# Patient Record
Sex: FEMALE | Race: BLACK OR AFRICAN AMERICAN | NOT HISPANIC OR LATINO | Employment: FULL TIME | URBAN - METROPOLITAN AREA
[De-identification: names, ages, dates, MRNs, and addresses within clinical notes are randomized per-mention and may not be internally consistent; named-entity substitution may affect disease eponyms.]

---

## 2017-11-27 ENCOUNTER — TELEPHONE (OUTPATIENT)
Dept: OBSTETRICS AND GYNECOLOGY | Facility: CLINIC | Age: 32
End: 2017-11-27

## 2017-11-27 DIAGNOSIS — D25.1 INTRAMURAL AND SUBSEROUS LEIOMYOMA OF UTERUS: Primary | ICD-10-CM

## 2017-11-27 DIAGNOSIS — D25.2 INTRAMURAL AND SUBSEROUS LEIOMYOMA OF UTERUS: Primary | ICD-10-CM

## 2017-11-27 NOTE — TELEPHONE ENCOUNTER
Patient is in the system and needs MRI orders. Also when do you want her scheduled for appt for consult.

## 2017-11-27 NOTE — TELEPHONE ENCOUNTER
----- Message from Jeffry Rivera MA sent at 11/27/2017 10:14 AM CST -----  Contact: Self  Pt is calling to schedule a Fibroid appt with Dr Ruiz.  The pt can be reached at [phone].  Thanks FL

## 2017-11-27 NOTE — TELEPHONE ENCOUNTER
Left message to patient to call the office at [phone] to confirm allergies so that MRI orders can be placed and consult can be scheduled.

## 2017-11-27 NOTE — TELEPHONE ENCOUNTER
Called patient and confirmed allergies. Patient needs MRI orders and states she would prefer to do the MRI the same day as her appt with [facility] since she is coming from [facility].

## 2017-11-27 NOTE — TELEPHONE ENCOUNTER
----- Message from Matthew Peacock sent at 11/27/2017  3:28 PM CST -----  Pt returning your call [phone]

## 2017-11-27 NOTE — TELEPHONE ENCOUNTER
Yes, she needs to be scheduled for a consult.  I can't place the orders until her allergies are obtained.  Please call her to document her allergies.  Thanks

## 2017-11-28 ENCOUNTER — TELEPHONE (OUTPATIENT)
Dept: OBSTETRICS AND GYNECOLOGY | Facility: CLINIC | Age: 32
End: 2017-11-28

## 2017-11-28 NOTE — TELEPHONE ENCOUNTER
Contacted the pt to inform her that her MRI order has been placed and that she can get it scheduled. No answer, left VM message for the pt to call the clinic back if she has any questions or concerns.

## 2017-11-28 NOTE — TELEPHONE ENCOUNTER
Contacted the pt to schedule her fibroids consult with Dr. Ruiz and her MRI. Pt given appointment locations, dates, and times. Pt informed that letter reminders will be sent out. Pt inquired if the addresses will be on the letter reminders. Informed the pt that they will. Pt verbalized understanding. Letters sent out.

## 2017-11-28 NOTE — TELEPHONE ENCOUNTER
----- Message from Ra Alvarenga sent at 11/28/2017 10:27 AM CST -----  Please call pt to explain her procedure for mri pelvis. Pt can be reached at [phone].

## 2017-12-12 ENCOUNTER — HOSPITAL ENCOUNTER (OUTPATIENT)
Dept: RADIOLOGY | Facility: OTHER | Age: 32
Discharge: HOME | End: 2017-12-12
Attending: OBSTETRICS & GYNECOLOGY
Payer: COMMERCIAL

## 2017-12-12 DIAGNOSIS — D25.2 INTRAMURAL AND SUBSEROUS LEIOMYOMA OF UTERUS: ICD-10-CM

## 2017-12-12 DIAGNOSIS — D25.1 INTRAMURAL AND SUBSEROUS LEIOMYOMA OF UTERUS: ICD-10-CM

## 2017-12-12 PROCEDURE — 25500020 PHARM REV CODE 255: Performed by: OBSTETRICS & GYNECOLOGY

## 2017-12-12 PROCEDURE — 72197 MRI PELVIS W/O & W/DYE: CPT | Mod: 26,,, | Performed by: RADIOLOGY

## 2017-12-12 PROCEDURE — A9585 GADOBUTROL INJECTION: HCPCS | Performed by: OBSTETRICS & GYNECOLOGY

## 2017-12-12 PROCEDURE — 72197 MRI PELVIS W/O & W/DYE: CPT | Mod: TC

## 2017-12-12 RX ORDER — GADOBUTROL 604.72 MG/ML
10 INJECTION INTRAVENOUS
Status: COMPLETED | OUTPATIENT
Start: 2017-12-12 | End: 2017-12-12

## 2017-12-12 RX ADMIN — GADOBUTROL 10 ML: 604.72 INJECTION INTRAVENOUS at 11:12

## 2017-12-14 ENCOUNTER — INITIAL CONSULT (OUTPATIENT)
Dept: MATERNAL FETAL MEDICINE | Facility: CLINIC | Age: 32
End: 2017-12-14
Payer: COMMERCIAL

## 2017-12-14 ENCOUNTER — OFFICE VISIT (OUTPATIENT)
Dept: OBSTETRICS AND GYNECOLOGY | Facility: CLINIC | Age: 32
End: 2017-12-14
Attending: OBSTETRICS & GYNECOLOGY
Payer: COMMERCIAL

## 2017-12-14 VITALS
DIASTOLIC BLOOD PRESSURE: 94 MMHG | WEIGHT: 270.06 LBS | SYSTOLIC BLOOD PRESSURE: 158 MMHG | BODY MASS INDEX: 44.94 KG/M2

## 2017-12-14 VITALS
BODY MASS INDEX: 45.04 KG/M2 | SYSTOLIC BLOOD PRESSURE: 144 MMHG | DIASTOLIC BLOOD PRESSURE: 88 MMHG | HEIGHT: 65 IN | WEIGHT: 270.31 LBS

## 2017-12-14 DIAGNOSIS — D25.1 FIBROIDS, INTRAMURAL: Primary | ICD-10-CM

## 2017-12-14 DIAGNOSIS — E66.01 MORBID OBESITY WITH BMI OF 40.0-44.9, ADULT: ICD-10-CM

## 2017-12-14 PROCEDURE — 99244 OFF/OP CNSLTJ NEW/EST MOD 40: CPT | Mod: S$GLB,,, | Performed by: OBSTETRICS & GYNECOLOGY

## 2017-12-14 PROCEDURE — 99999 PR PBB SHADOW E&M-EST. PATIENT-LVL II: CPT | Mod: PBBFAC,,, | Performed by: OBSTETRICS & GYNECOLOGY

## 2017-12-14 PROCEDURE — 99999 PR PBB SHADOW E&M-EST. PATIENT-LVL III: CPT | Mod: PBBFAC,,, | Performed by: OBSTETRICS & GYNECOLOGY

## 2017-12-14 PROCEDURE — 99243 OFF/OP CNSLTJ NEW/EST LOW 30: CPT | Mod: S$GLB,,, | Performed by: OBSTETRICS & GYNECOLOGY

## 2017-12-14 RX ORDER — SODIUM CHLORIDE 9 MG/ML
INJECTION, SOLUTION INTRAVENOUS CONTINUOUS
Status: CANCELLED | OUTPATIENT
Start: 2017-12-14

## 2017-12-14 NOTE — LETTER
December 19, 2017      Rosette Dawn MD  [address]   [facility]  [facility] [address]           [facility]  [address]  [facility] [address]  Phone: [phone]          Patient: Lore Garcia   MR Number: [id number]   YOB: 1985   Date of Visit: 12/14/2017       Dear Dr. Rosette Dawn:    Thank you for referring Lore Garcia to me for evaluation. Attached you will find relevant portions of my assessment and plan of care.    If you have questions, please do not hesitate to call me. I look forward to following Lore Garcia along with you.    Sincerely,    Effie Alegria  CC:  No Recipients    If you would like to receive this communication electronically, please contact [email or web address] or [phone] to request more information on [employer] access.    For providers and/or their staff who would like to refer a patient to [facility], please contact us through our one-stop-shop provider referral line, Con Pierce, at [phone].    If you feel you have received this communication in error or would no longer like to receive these types of communications, please e-mail [email or web address]

## 2017-12-14 NOTE — PROGRESS NOTES
CC: Symptoms related to fibroids    Lore Garcia is a 32 y.o. female  presents for a consultation from Dr. Rosette Dawn for management of fibroids.      Patient had a previous pregnancy.  Around 19 weeks, she began having severe pelvic pain on a Tuesday.  She was examined in clinic and in ultrasound and cervix was found to be closed per patient.  By , pain became more severe.  She was seen in the ED and found to have a bulging bag of water.  She delivered soon after.  Prior to pregnancy, she did not know that she had fibroids.      Patient reports cycles are regular but heavy.  She has to use pads and tampons. She reports clots.  She also reports accidents.  She reports minimal dysmenorrhea.      MRI shows:    Uterus: Enlarged, measuring 15.5 x 8.8 x 8.0 cm with lobulated contour containing multiple fibroids.  Fibroids are primarily intramural and subserosal in location, with 3 of the smaller fibroids directly abutting the endometrium and a questionably submucosal location; the largest of these 3 measures 2.3 x 2.8 x 2.1 cm anteriorly along the uterine body.  The largest fibroid overall is subserosal in location and measures 8.6 x 7.8 x 7.3 cm at the uterine fundus.  Mild heterogeneous enhancement is present within a few of the smaller fibroids, though overall there is no enhancement.  Fibroids are primarily heterogeneously T2 hypointense without substantial enhancement.  A pedunculated subserosal fibroid along the posterior uterine body/fundus measuring 2.2 x 2.0 x 2.9 cm has small amount of intrinsic T2 hyperintensity corresponding to an area of non-enhancement suggesting component of myxoid degeneration.  The endometrial stripe is distorted and displaced by the fibroids and measures up to 10 mm in thickness.    Right ovary: 2.3 x 1.4 x 2.6 cm with normal appearance.    Left ovary: 2.5 x 2.2 x 1.6 cm with normal appearance.    Other intra-abdominal structures: No obvious abnormality.  Trace free  "fluid.    Bones: Normal marrow signal.  Mild disc bulge L5-S1 without spinal canal stenosis but with suggestion of left-sided foraminal stenosis.    She desires to conceive again.      Past Medical History:   Diagnosis Date    Hypertension        Past Surgical History:   Procedure Laterality Date    THYROIDECTOMY, PARTIAL Left        Family History   Problem Relation Age of Onset    Breast cancer Neg Hx     Colon cancer Neg Hx     Ovarian cancer Neg Hx        Social History   Substance Use Topics    Smoking status: Never Smoker    Smokeless tobacco: Never Used    Alcohol use 1.2 oz/week     2 Glasses of wine per week       OB History    Para Term  AB Living   1 0   0 1 0   SAB TAB Ectopic Multiple Live Births   1       0      # Outcome Date GA Lbr Fran/2nd Weight Sex Delivery Anes PTL Lv   1 SAB 17 20w0d  0.283 kg (10 oz) F Vag-Spont  N FD      Complications: Abruptio Placenta          BP (!) 144/88 (BP Location: Left arm, Patient Position: Sitting, BP Method: Large (Manual))   Ht 5' 5" (1.651 m)   Wt 122.6 kg (270 lb 4.5 oz)   LMP 2017   BMI 44.98 kg/m²     ROS:  GENERAL: Denies weight gain or weight loss. Feeling well overall.   SKIN: Denies rash or lesions.   HEAD: Denies head injury or headache.   NODES: Denies enlarged lymph nodes.   CHEST: Denies chest pain or shortness of breath.   CARDIOVASCULAR: Denies palpitations or left sided chest pain.   ABDOMEN: No abdominal pain, constipation, diarrhea, nausea, vomiting or rectal bleeding.   URINARY: No frequency, dysuria, hematuria, or burning on urination.  REPRODUCTIVE: See HPI.   HEMATOLOGIC: No easy bruisability or excessive bleeding with the exception of menstrual cycles.  MUSCULOSKELETAL: Denies joint pain or swelling.   NEUROLOGIC: Denies syncope or weakness.   PSYCHIATRIC: Denies depression, anxiety or mood swings.    PHYSICAL EXAM:  APPEARANCE: Well nourished, well developed, in no acute distress.  AFFECT: WNL, alert " and oriented x 3  SKIN: No acne or hirsutism  NECK: Neck symmetric without masses or thyromegaly  NODES: No inguinal, cervical, axillary, or femoral lymph node enlargement  CHEST: Good respiratory effect  ABDOMEN: Soft.  No tenderness or masses.  No hepatosplenomegaly.  No hernias.  PELVIC: Normal external genitalia without lesions.  Normal hair distribution.  Adequate perineal body, normal urethral meatus.  Vagina moist and well rugated without lesions or discharge.  Cervix pink, without lesions, discharge or tenderness.  No significant cystocele or rectocele.  Bimanual exam shows uterus to be approximately 16 week size, irregular, mobile and nontender.  Adnexa without masses or tenderness but difficult to assess due to body habitus.    EXTREMITIES: No edema.      ICD-10-CM ICD-9-CM    1. Fibroids, intramural D25.1 218.1 Up ad jean carlos      [place] to [place]      POCT glucose      Notify physician if BS > 180 for hysterectomy patients      Chlorhexidine (CHG) 2% Wipes      Notify Physician/Vital Signs Parameters Urine output less than 0.5mL/kg/hr (with indwelling catheter) or 30 mL/hr (without indwelling catheter) or blood glucose greater than 200 mg/dL      Notify physician      Case Request Operating Room: ROBOTIC ASSISTED LAPAROSCOPIC MYOMECTOMY      Place in Outpatient      Vital signs      Diet NPO      Place sequential compression device      Place HARINI hose   2. Morbid obesity with BMI of 40.0-44.9, adult E66.01 278.01     Z68.41 V85.41          Patient was counseled today on fibroids and affect on fertility and pregnancy.  Discussed that previous second trimester loss may or may not have been due to fibroids, though it does sound like she may have had degenerative changes that caused labor.  Discussed management of fibroids and the risks/benefits of removing fibroids.  Patient desires removal.  Discussed routes of myomectomy - robotically assisted vs. Open myomectomy.  Discussed the benefits of a minimally  invasive option as well as the limitations (i.e. Not being able to remove all fibroids).  Discussed obesity and risk of complications both laparoscopically and open specific to obesity.  Discussed the risk of recurrence of fibroids, the need to delay conception for 4-6 months and the need for  for delivery.      Patient desires to proceed with a robotically assisted laparoscopic approach.  Surgery scheduled .

## 2017-12-14 NOTE — LETTER
2017        Camron Bean MD  [address] [address]             [facility] [address]  [address] [place] [address] [address]  [facility] [address]  Phone: [phone]  Fax: [phone]   Patient: Lore Garcia   MR Number: [id number]   YOB: 1985   Date of Visit: 2017     Dear Dr. Bean,     Lore Garcia is a 32 y.o. female  has been referred to me for management of her fibroids.  As you know, Lore has multiple fibroids.  After discussion of treatment options, she has decided to proceed with a robotic assisted laparoscopic myomectomy.      Thank you for allowing me to assist in the care of your patient.    Sincerely,      Elicia Ruiz MD            CC  No Recipients    Enclosure

## 2017-12-14 NOTE — PATIENT INSTRUCTIONS
Having Robotic-Assisted Myomectomy  Robotic-assisted myomectomy is a type of surgery. Its done to remove growths in a womens womb (uterus) called fibroid tumors. The tumors are not cancer. The surgery is done with special tools and a robotic controller.  What to tell your healthcare provider  Tell your healthcare provider about all the medicines you take. This includes over-the-counter medicines such as ibuprofen. It also includes vitamins, herbs, and other supplements. And tell your healthcare provider if you:  · Have had any recent changes in your health, such as an infection or fever  · Are sensitive or allergic to any medicines, latex, tape, or anesthesia (local and general)  · Are pregnant or think you may be pregnant  · Plan to get pregnant after having this surgery  Tests before your surgery  Before your surgery, a healthcare provider will ask you questions about your health. He or she will also examine you. This includes checking your heart and lungs. You may need tests such as:  · Electrocardiogram to check your heart rhythm  · Ultrasound exam of your pelvis to look at your fibroids  · MRI to get more information about your fibroids  · Blood tests to check your overall health  Getting ready for your surgery  Talk with your healthcare provider how to get ready for your surgery. You may need to stop taking some medicines before the procedure, such as blood thinners and aspirin. If you smoke, you may need to stop before your surgery. Smoking can delay healing. Talk with your healthcare provider if you need help to stop smoking.  Also, make sure to:  · Ask a family member or friend to take you home from the hospital  · Not eat or drink after midnight the night before your surgery  · Follow all other instructions from your healthcare provider  You will be asked to sign a consent form that gives your permission to do the procedure. Read the form carefully. Ask questions if something is not clear.  On the day  of your surgery  Your doctor will explain the details of your surgery. Your surgery will be done by an obstetrician/gynecologist (OB/GYN) surgeon. He or she will work with a team of specialized nurses. The surgery can be done in several ways. Ask your doctor about the details of your surgery. The whole procedure may take a couple of hours. In general, you can expect the following:  · You will have general anesthesia, a medicine that allows you to sleep through the surgery. You wont feel any pain during the surgery.  · A healthcare provider will watch your vital signs, like your heart rate and blood pressure, during the surgery.  · You may be given antibiotics during and after the surgery. This is to help prevent infection.  · After cleaning your skin, the surgeon will make a few small cuts (incisions) in your abdomen.  · A small tube may be used to send some gas into your abdomen. This helps the surgeon see the area better during surgery.  · the surgeon will pass tools through the small incisions. These include a tiny camera with a light, and several robotic tools. The robotic tools allow the surgeon to make very small movements.  · The surgeon will use the robotic controller to move the tools and remove the fibroids.  · When the surgery is done, the tools will be removed. The incisions will be closed and bandaged.  After your surgery  After the surgery, a healthcare provider will watch your vital signs. He or she will also check your incisions. You may be able to go home the same day. Or you may need to stay overnight.  Recovering at home  Make sure you move around as much as possible after your surgery. This helps to prevent problems like blood clots. You may have some pain after the surgery. This includes pain in your shoulder from the gas used during surgery. Your healthcare provider will tell you what to take for pain.  Follow-up care  Make sure you follow all of your healthcare providers instructions. Dont  miss any of your follow-up appointments. Your fibroid symptoms may go away after surgery. Fibroids can grow back or new ones can form. Talk with your healthcare provider if your symptoms return. Tell your provider if you get pregnant after having this surgery.  When to call your healthcare provider  Call your healthcare provider right away if you have any of these:  · Fever of 100.4°F (38.0°C) or higher  · Pain that is getting worse  · Redness or warmth near the incisions  · Vaginal bleeding  · Lots of fluid leaking from your incisions   Date Last Reviewed: 8/10/2015  © 0374-2258 [employer]. [address] [place] [address]. All rights reserved. This information is not intended as a substitute for professional medical care. Always follow your healthcare professional's instructions.        Understanding Robotic-Assisted Myomectomy  Robotic-assisted myomectomy is a type of surgery. Its done to remove growths in a womens womb (uterus) called fibroid tumors. The tumors are not cancer. The surgery is done with special tools and a robotic controller.  What are fibroids?  The uterus is the organ where a baby grows during pregnancy. Its in the lower belly (abdomen). Fibroid tumors are a very common type of growth in the uterus. They are also called leiomyomas or myomas. They are not cancer. They vary in size and number. A woman may have 1 or more fibroid tumors. They may be very small or as large as a grapefruit. A womans risk of having fibroid tumors increases as she gets older, until she no longer has menstrual periods (menopause).  Why robotic-assisted myomectomy is done  You might need this surgery if you have fibroids that cause symptoms such as:  · Heavy and long-lasting bleeding during your period  · Lower belly (pelvic) pain  · Pressure on the bladder or bowels  · Trouble getting or staying pregnant  This is one type of surgery to remove fibroids. It is done with smaller incisions than  standard surgery. This is known as minimally invasive surgery. It has some benefits over other surgery to remove fibroids. They may include:  · Lower risk for complications (for example, less bleeding during surgery)  · [place] hospital stay  · Faster recovery  · Uterus is left in place  But robotic-assisted myomectomy may:  · Take longer than other surgeries  · Not be available where you live  · Cost more  Your healthcare provider can help you decide which surgery will work best for you.  How robotic-assisted myomectomy is done   The surgery will be done by an obstetrician/gynecologist (OB/GYN) surgeon. It can be done in several ways. The surgeon will make a few small cuts (incisions) in your abdomen. He or she will pass tools through the small incisions. These include a tiny camera with a light, and several robotic tools. The surgeon will use a robotic controller to move the tools and remove the fibroids.  Risks of robotic-assisted myomectomy  Every surgery has risks. Risks of robotic-assisted myomectomy include:  · Infection  · Bleeding  · Blood clots  · Injury to nearby organs  · Problems with a future pregnancy  · Reaction to anesthesia  · Return of fibroids after surgery  Your risks may vary depending on your age and overall health. They also depend on the number and location of the fibroids. Talk with your healthcare provider about which risks apply most to you.  Date Last Reviewed: 5/6/2015  © 3487-7746 [employer]. [address] [place] [address], [place] [address]. All rights reserved. This information is not intended as a substitute for professional medical care. Always follow your healthcare professional's instructions.

## 2017-12-18 ENCOUNTER — PATIENT MESSAGE (OUTPATIENT)
Dept: OBSTETRICS AND GYNECOLOGY | Facility: CLINIC | Age: 32
End: 2017-12-18

## 2017-12-18 ENCOUNTER — PATIENT MESSAGE (OUTPATIENT)
Dept: ADMINISTRATIVE | Facility: OTHER | Age: 32
End: 2017-12-18

## 2017-12-19 ENCOUNTER — PATIENT MESSAGE (OUTPATIENT)
Dept: GYNECOLOGY | Facility: OTHER | Age: 32
End: 2017-12-19

## 2017-12-26 NOTE — PROGRESS NOTES
Pre-pregnancy consultation for history of 20 week delivery and  demise.    31 yo  presents for prepregnancy consultation. Records reviewed. In 2017, had 1 week of severe lower abdominal and back pain. Was seen by MFM who noted a degenerating fibroid and pain attributed to that. On  am, woke up in severe pain, thanh to Er, membranes bulging. Delivered that evening. Placental path with chorioamnionitis.     ROS: negative    Meds: MVI    PMH: Obesity  CHTN-~ 3 years ago, no medications (BP today 158/94)  Uterine fibroids    PSH:  Partial thyroidectomy     Sh: neg    Fh: neg for birth defects    OB Hx: as per HPI, 1 pregnancy loss at 20 weeks    158/94, weight 132.5 kg    Counseled on prior pregnancy loss. Discussed that history sounds more consistent with  labor than with cervical insufficiency due to the amount of pain she experienced. Discussed that it is difficult to say whether the degenerating fibroid was responsible for the onset of labor or not. Blood pressures were also elevated during this period, but the patient reports being in a considerable amount of pain. Labs from 8/15/17 available for review-protein was 77.5 mg (negative).There was no significant amount of bleeding noted at the time of delivery and the placental pathology is not consistent with an abruption. Placental pathology was significant for acute chorioamnionitis. We discussed that we cannot accurately predict when this occurred, but it could have occurred during the labor process. Discussed that we would recommend 17 hydroxyprogesterone caproate to be given weekly starting at 16 weeks until 36 weeks due to the history of  labor and  delivery. Discussed that we would recommend serial cervical length measurements from 16 weeks to 22 weeks and 6 days due to her history of  labor to assess for whether a cervical cerclage is needed. She is planning for a myomectomy with Dr. Ruiz. We discussed  an interval from surgery to conception of ~ 3-6 months and would recommend against attempting conception prior to this summer. Discussed that delivery will likely be indicated ~ 37 weeks gestation due to the myomectomy.   We also discussed that there are new recommendations for hypertension in non-pregnant adults. I recommend the patient see her primary care provider as she may need to be started on antihypertensive medications given these new guidelines. We discussed the risks of hypertension in pregnancy (miscarriage, fetal growth restriction, preeclampsia) and our general approach to management with and without medications. We discussed the need for a low dose aspirin to be started between 12-14 weeks gestation to reduce the risk of preeclampsia. We discussed the need for serial growth ultrasounds in the third trimester and  testing at 32 weeks for fetal well-being assessment. We would recommend obtaining a CMP, CBC, and urine protein to creatinine ratio in the first trimester to have at baseline.  We have recommended Ms. Garcia see her primary MD in the next few weeks to discuss blood pressure control and weight management.   Her surgery is planned with Dr. Ruiz in 2018.  Thank you for allowing us to see Ms. Garcia for a pre-pregnancy consultation. We look forward to assisting in her care in the future.  The approximate physician face to face time was 40 minutes. The majority of time (greater than 50%) was spent on counseling of the patient or coordination of care.

## 2018-01-25 ENCOUNTER — TELEPHONE (OUTPATIENT)
Dept: OBSTETRICS AND GYNECOLOGY | Facility: CLINIC | Age: 33
End: 2018-01-25

## 2018-01-25 NOTE — TELEPHONE ENCOUNTER
Attempted to contact the pt to inquire if she would like to move her Sx to 2/2. No answer, left VM message for the pt to call the clinic back.

## 2018-01-29 ENCOUNTER — ANESTHESIA EVENT (OUTPATIENT)
Dept: SURGERY | Facility: OTHER | Age: 33
End: 2018-01-29
Payer: COMMERCIAL

## 2018-01-29 ENCOUNTER — OFFICE VISIT (OUTPATIENT)
Dept: OBSTETRICS AND GYNECOLOGY | Facility: CLINIC | Age: 33
End: 2018-01-29
Attending: OBSTETRICS & GYNECOLOGY
Payer: COMMERCIAL

## 2018-01-29 ENCOUNTER — HOSPITAL ENCOUNTER (OUTPATIENT)
Dept: PREADMISSION TESTING | Facility: OTHER | Age: 33
Discharge: HOME | End: 2018-01-29
Attending: OBSTETRICS & GYNECOLOGY
Payer: COMMERCIAL

## 2018-01-29 VITALS
TEMPERATURE: 98 F | HEIGHT: 65 IN | WEIGHT: 274 LBS | DIASTOLIC BLOOD PRESSURE: 79 MMHG | BODY MASS INDEX: 45.65 KG/M2 | SYSTOLIC BLOOD PRESSURE: 176 MMHG | HEART RATE: 84 BPM | OXYGEN SATURATION: 100 %

## 2018-01-29 VITALS
BODY MASS INDEX: 45.73 KG/M2 | SYSTOLIC BLOOD PRESSURE: 146 MMHG | HEIGHT: 65 IN | WEIGHT: 274.5 LBS | DIASTOLIC BLOOD PRESSURE: 102 MMHG

## 2018-01-29 DIAGNOSIS — E66.9 OBESITY, UNSPECIFIED CLASSIFICATION, UNSPECIFIED OBESITY TYPE, UNSPECIFIED WHETHER SERIOUS COMORBIDITY PRESENT: ICD-10-CM

## 2018-01-29 DIAGNOSIS — Z01.818 PREOPERATIVE EXAM FOR GYNECOLOGIC SURGERY: Primary | ICD-10-CM

## 2018-01-29 DIAGNOSIS — D25.1 FIBROIDS, INTRAMURAL: ICD-10-CM

## 2018-01-29 DIAGNOSIS — I10 HYPERTENSION, UNSPECIFIED TYPE: ICD-10-CM

## 2018-01-29 PROCEDURE — 99999 PR PBB SHADOW E&M-EST. PATIENT-LVL II: CPT | Mod: PBBFAC,,, | Performed by: OBSTETRICS & GYNECOLOGY

## 2018-01-29 PROCEDURE — 99499 UNLISTED E&M SERVICE: CPT | Mod: S$GLB,,, | Performed by: OBSTETRICS & GYNECOLOGY

## 2018-01-29 RX ORDER — LIDOCAINE HYDROCHLORIDE 10 MG/ML
0.5 INJECTION, SOLUTION EPIDURAL; INFILTRATION; INTRACAUDAL; PERINEURAL ONCE
Status: CANCELLED | OUTPATIENT
Start: 2018-01-29 | End: 2018-01-29

## 2018-01-29 RX ORDER — MIDAZOLAM HYDROCHLORIDE 5 MG/ML
4 INJECTION INTRAMUSCULAR; INTRAVENOUS ONCE AS NEEDED
Status: CANCELLED | OUTPATIENT
Start: 2018-01-29 | End: 2018-01-29

## 2018-01-29 RX ORDER — SODIUM CHLORIDE, SODIUM LACTATE, POTASSIUM CHLORIDE, CALCIUM CHLORIDE 600; 310; 30; 20 MG/100ML; MG/100ML; MG/100ML; MG/100ML
INJECTION, SOLUTION INTRAVENOUS CONTINUOUS
Status: CANCELLED | OUTPATIENT
Start: 2018-01-29

## 2018-01-29 RX ORDER — FAMOTIDINE 20 MG/1
20 TABLET, FILM COATED ORAL
Status: CANCELLED | OUTPATIENT
Start: 2018-01-29 | End: 2018-01-29

## 2018-01-29 NOTE — DISCHARGE INSTRUCTIONS
PRE-ADMIT TESTING -  [phone]    [address]  [facility]          Your surgery has been scheduled at [facility]. We are pleased to have the opportunity to serve you. For Further Information please call [phone].    On the day of surgery please report to the Information Desk on the 1st floor.    · CONTACT YOUR PHYSICIAN'S OFFICE THE DAY PRIOR TO YOUR SURGERY TO OBTAIN YOUR ARRIVAL TIME.     · The evening before surgery do not eat anything after 9 p.m. ( this includes hard candy, chewing gum and mints).  You may only have GATORADE, POWERADE AND WATER  from 9 p.m. until you leave your home.   DO NOT DRINK ANY LIQUIDS ON THE WAY TO THE HOSPITAL.      SPECIAL MEDICATION INSTRUCTIONS: TAKE medications checked off by the Anesthesiologist on your Medication List.    Angiogram Patients: Take medications as instructed by your physician, including aspirin.     Surgery Patients:    If you take ASPIRIN - Your PHYSICIAN/SURGEON will need to inform you IF/OR when you need to stop taking aspirin prior to your surgery.     Do Not take any medications containing IBUPROFEN.  Do Not Wear any make-up or dark nail polish   (especially eye make-up) to surgery. If you come to surgery with makeup on you will be required to remove the makeup or nail polish.    Do not shave your surgical area at least 5 days prior to your surgery. The surgical prep will be performed at the hospital according to Infection Control regulations.    Leave all valuables at home.   Do Not wear any jewelry or watches, including any metal in body piercings.  Contact Lens must be removed before surgery. Either do not wear the contact lens or bring a case and solution for storage.  Please bring a container for eyeglasses or dentures as required.  Bring any paperwork your physician has provided, such as consent forms,  history and physicals, doctor's orders, etc.   Bring comfortable clothes that are loose fitting to wear upon  discharge. Take into consideration the type of surgery being performed.  Maintain your diet as advised per your physician the day prior to surgery.      Adequate rest the night before surgery is advised.   Park in the Parking lot behind the hospital or in the [facility] across the street from the parking lot. Parking is complimentary.  If you will be discharged the same day as your procedure, please arrange for a responsible adult to drive you home or to accompany you if traveling by taxi.   YOU WILL NOT BE PERMITTED TO DRIVE OR TO LEAVE THE HOSPITAL ALONE AFTER SURGERY.   It is strongly recommended that you arrange for someone to remain with you for the first 24 hrs following your surgery.       Thank you for your cooperation.  The Staff of [facility].        Bathing Instructions                                                                 Please shower the evening before and morning of your procedure with    ANTIBACTERIAL SOAP. ( DIAL, etc )  Concentrate on the surgical area   for at least 3 minutes and rinse completely. Dry off as usual.   Do not use any deodorant, powder, body lotions, perfume, after shave or    cologne.

## 2018-01-29 NOTE — ANESTHESIA PREPROCEDURE EVALUATION
01/29/2018  Lore Garcia is a 32 y.o., female.    Anesthesia Evaluation    I have reviewed the Patient Summary Reports.    I have reviewed the Nursing Notes.   I have reviewed the Medications.     Review of Systems  Anesthesia Hx:  No problems with previous Anesthesia  Denies Family Hx of Anesthesia complications.    Social:  Non-Smoker    Hematology/Oncology:  Hematology Normal   Oncology Normal     EENT/Dental:EENT/Dental Normal   Cardiovascular:   Hypertension, well controlled    Pulmonary:  Pulmonary Normal    Renal/:  Renal/ Normal     Hepatic/GI:  Hepatic/GI Normal    Musculoskeletal:  Musculoskeletal Normal    Neurological:  Neurology Normal    Endocrine:  Endocrine Normal    Dermatological:  Skin Normal    Psych:  Psychiatric Normal           Physical Exam  General:  Morbid Obesity    Airway/Jaw/Neck:  Airway Findings: Mouth Opening: Normal Tongue: Normal  General Airway Assessment: Adult  Mallampati: II  TM Distance: Normal, at least 6 cm  Jaw/Neck Findings:     Neck ROM: Normal ROM      Dental:  Dental Findings: In tact             Anesthesia Plan  Type of Anesthesia, risks & benefits discussed:  Anesthesia Type:  general  Patient's Preference:   Intra-op Monitoring Plan:   Intra-op Monitoring Plan Comments:   Post Op Pain Control Plan:   Post Op Pain Control Plan Comments:   Induction:   IV  Beta Blocker:         Informed Consent: Patient understands risks and agrees with Anesthesia plan.  Questions answered. Anesthesia consent signed with patient.  ASA Score: 2     Day of Surgery Review of History & Physical:    H&P update referred to the surgeon.     Anesthesia Plan Notes: Labs: hct 38, glucose 129        Ready For Surgery From Anesthesia Perspective.

## 2018-01-29 NOTE — PROGRESS NOTES
CC: Preop exam    Lore Garcia is a 32 y.o. female  presents for a pre-op exam for a Robotic assisted laparoscopic myomectomy scheduled on .      Patient had a previous pregnancy.  Around 19 weeks, she began having severe pelvic pain on a Tuesday.  She was examined in clinic and in ultrasound and cervix was found to be closed per patient.  By , pain became more severe.  She was seen in the ED and found to have a bulging bag of water.  She delivered soon after.  Prior to pregnancy, she did not know that she had fibroids.       Patient reports cycles are regular but heavy.  She has to use pads and tampons. She reports clots.  She also reports accidents.  She reports minimal dysmenorrhea.       MRI shows:     Uterus: Enlarged, measuring 15.5 x 8.8 x 8.0 cm with lobulated contour containing multiple fibroids.  Fibroids are primarily intramural and subserosal in location, with 3 of the smaller fibroids directly abutting the endometrium and a questionably submucosal location; the largest of these 3 measures 2.3 x 2.8 x 2.1 cm anteriorly along the uterine body.  The largest fibroid overall is subserosal in location and measures 8.6 x 7.8 x 7.3 cm at the uterine fundus.  Mild heterogeneous enhancement is present within a few of the smaller fibroids, though overall there is no enhancement.  Fibroids are primarily heterogeneously T2 hypointense without substantial enhancement.  A pedunculated subserosal fibroid along the posterior uterine body/fundus measuring 2.2 x 2.0 x 2.9 cm has small amount of intrinsic T2 hyperintensity corresponding to an area of non-enhancement suggesting component of myxoid degeneration.  The endometrial stripe is distorted and displaced by the fibroids and measures up to 10 mm in thickness.    Right ovary: 2.3 x 1.4 x 2.6 cm with normal appearance.    Left ovary: 2.5 x 2.2 x 1.6 cm with normal appearance.    Other intra-abdominal structures: No obvious abnormality.  " Trace free fluid.    Bones: Normal marrow signal.  Mild disc bulge L5-S1 without spinal canal stenosis but with suggestion of left-sided foraminal stenosis.     From my review of images, 6 fibroids are seen 5 in the anterior portion of the uterus, 1 posterior fibroid.  She desires to conceive again.      Past Medical History:   Diagnosis Date    Hypertension     Thyroid disease        Past Surgical History:   Procedure Laterality Date    THYROIDECTOMY, PARTIAL Left        OB History    Para Term  AB Living   1 0   0 1 0   SAB TAB Ectopic Multiple Live Births   1       0      # Outcome Date GA Lbr Fran/2nd Weight Sex Delivery Anes PTL Lv   1 SAB 17 20w0d  0.283 kg (10 oz) F Vag-Spont  N FD      Complications: Abruptio Placenta          Family History   Problem Relation Age of Onset    Breast cancer Neg Hx     Colon cancer Neg Hx     Ovarian cancer Neg Hx        Social History   Substance Use Topics    Smoking status: Never Smoker    Smokeless tobacco: Never Used    Alcohol use No       BP (!) 146/102 (BP Location: Left arm, Patient Position: Sitting, BP Method: Large (Manual))   Ht 5' 5" (1.651 m)   Wt 124.5 kg (274 lb 7.6 oz)   LMP 2018 (Exact Date)   BMI 45.67 kg/m²     ROS:  GENERAL: Denies weight gain or weight loss. Feeling well overall.   SKIN: Denies rash or lesions.   HEAD: Denies head injury or headache.   NODES: Denies enlarged lymph nodes.   CHEST: Denies chest pain or shortness of breath.   CARDIOVASCULAR: Denies palpitations or left sided chest pain.   ABDOMEN: No abdominal pain, constipation, diarrhea, nausea, vomiting or rectal bleeding.   URINARY: No frequency, dysuria, hematuria, or burning on urination.  REPRODUCTIVE: See HPI.   HEMATOLOGIC: No easy bruisability or excessive bleeding with the exception of menstrual cycles.  MUSCULOSKELETAL: Denies joint pain or swelling.   NEUROLOGIC: Denies syncope or weakness.   PSYCHIATRIC: Denies depression, anxiety or " mood swings.    PHYSICAL EXAM:  APPEARANCE: Well nourished, well developed, in no acute distress.  AFFECT: WNL, alert and oriented x 3  SKIN: No acne or hirsutism  NECK: Neck symmetric without masses or thyromegaly  NODES: No inguinal, cervical, axillary, or femoral lymph node enlargement  CHEST: Good respiratory effect  ABDOMEN: Soft.  No tenderness or masses.  No hepatosplenomegaly.  No hernias.  PELVIC: Deferred  EXTREMITIES: No edema.      ICD-10-CM ICD-9-CM    1. Preoperative exam for gynecologic surgery Z01.818 V72.83    2. Fibroids, intramural D25.1 218.1    3. Obesity, unspecified classification, unspecified obesity type, unspecified whether serious comorbidity present E66.9 278.00    4. Hypertension, unspecified type I10 401.9         Patient was counseled today on fibroids and affect on fertility and pregnancy.  Discussed that previous second trimester loss may or may not have been due to fibroids, though it does sound like she may have had degenerative changes that caused labor.  Discussed management of fibroids and the risks/benefits of removing fibroids.  Patient desires removal.  Discussed routes of myomectomy - robotically assisted vs. Open myomectomy.  Discussed the benefits of a minimally invasive option as well as the limitations (i.e. Not being able to remove all fibroids).  Discussed obesity and risk of complications both laparoscopically and open specific to obesity.  Discussed the risk of recurrence of fibroids, the need to delay conception for 4-6 months and the need for  for delivery.       Patient desires to proceed with a robotically assisted laparoscopic approach.  Surgery scheduled .      Discussed hypertension.  She sees Dr. Juan Shi in [facility] as a PCP.  Medication has not been recommended by her PCP.  I recommend f/u with him.        I have discussed the risks, benefits, indications, and alternatives of the procedure in detail.  The patient verbalizes her  understanding.  All questions answered.  Consents signed.  The patient agrees to proceed to proceed as planned.

## 2018-02-01 ENCOUNTER — PATIENT MESSAGE (OUTPATIENT)
Dept: SURGERY | Facility: OTHER | Age: 33
End: 2018-02-01

## 2018-02-06 ENCOUNTER — ANESTHESIA (OUTPATIENT)
Dept: SURGERY | Facility: OTHER | Age: 33
End: 2018-02-06
Payer: COMMERCIAL

## 2018-02-06 ENCOUNTER — HOSPITAL ENCOUNTER (OUTPATIENT)
Facility: OTHER | Age: 33
Discharge: HOME | End: 2018-02-06
Attending: OBSTETRICS & GYNECOLOGY | Admitting: OBSTETRICS & GYNECOLOGY
Payer: COMMERCIAL

## 2018-02-06 VITALS
SYSTOLIC BLOOD PRESSURE: 152 MMHG | DIASTOLIC BLOOD PRESSURE: 83 MMHG | HEIGHT: 65 IN | HEART RATE: 81 BPM | RESPIRATION RATE: 18 BRPM | WEIGHT: 274 LBS | TEMPERATURE: 98 F | BODY MASS INDEX: 45.65 KG/M2 | OXYGEN SATURATION: 98 %

## 2018-02-06 DIAGNOSIS — D25.1 FIBROIDS, INTRAMURAL: ICD-10-CM

## 2018-02-06 DIAGNOSIS — Z98.890 S/P MYOMECTOMY: Primary | ICD-10-CM

## 2018-02-06 LAB
ABO + RH BLD: NORMAL
B-HCG UR QL: NEGATIVE
BASOPHILS # BLD AUTO: 0.01 K/UL
BASOPHILS NFR BLD: 0.2 %
BLD GP AB SCN CELLS X3 SERPL QL: NORMAL
CTP QC/QA: YES
DIFFERENTIAL METHOD: ABNORMAL
EOSINOPHIL # BLD AUTO: 0.3 K/UL
EOSINOPHIL NFR BLD: 3.9 %
ERYTHROCYTE [DISTWIDTH] IN BLOOD BY AUTOMATED COUNT: 14.3 %
HCT VFR BLD AUTO: 38.1 %
HGB BLD-MCNC: 12.3 G/DL
LYMPHOCYTES # BLD AUTO: 3.2 K/UL
LYMPHOCYTES NFR BLD: 50 %
MCH RBC QN AUTO: 28.7 PG
MCHC RBC AUTO-ENTMCNC: 32.3 G/DL
MCV RBC AUTO: 89 FL
MONOCYTES # BLD AUTO: 0.4 K/UL
MONOCYTES NFR BLD: 6.2 %
NEUTROPHILS # BLD AUTO: 2.6 K/UL
NEUTROPHILS NFR BLD: 39.5 %
PLATELET # BLD AUTO: 301 K/UL
PMV BLD AUTO: 9.7 FL
POCT GLUCOSE: 129 MG/DL (ref 70–110)
RBC # BLD AUTO: 4.29 M/UL
WBC # BLD AUTO: 6.48 K/UL

## 2018-02-06 PROCEDURE — 81025 URINE PREGNANCY TEST: CPT | Performed by: ANESTHESIOLOGY

## 2018-02-06 PROCEDURE — 71000039 HC RECOVERY, EACH ADD'L HOUR: Performed by: OBSTETRICS & GYNECOLOGY

## 2018-02-06 PROCEDURE — 25000003 PHARM REV CODE 250: Performed by: ANESTHESIOLOGY

## 2018-02-06 PROCEDURE — 58546 LAPARO-MYOMECTOMY COMPLEX: CPT | Mod: ,,, | Performed by: OBSTETRICS & GYNECOLOGY

## 2018-02-06 PROCEDURE — 36000712 HC OR TIME LEV V 1ST 15 MIN: Performed by: OBSTETRICS & GYNECOLOGY

## 2018-02-06 PROCEDURE — 63600175 PHARM REV CODE 636 W HCPCS: Performed by: NURSE ANESTHETIST, CERTIFIED REGISTERED

## 2018-02-06 PROCEDURE — 27201423 OPTIME MED/SURG SUP & DEVICES STERILE SUPPLY: Performed by: OBSTETRICS & GYNECOLOGY

## 2018-02-06 PROCEDURE — 37000009 HC ANESTHESIA EA ADD 15 MINS: Performed by: OBSTETRICS & GYNECOLOGY

## 2018-02-06 PROCEDURE — 76998 US GUIDE INTRAOP: CPT | Mod: 26,,, | Performed by: OBSTETRICS & GYNECOLOGY

## 2018-02-06 PROCEDURE — 85025 COMPLETE CBC W/AUTO DIFF WBC: CPT

## 2018-02-06 PROCEDURE — 25000003 PHARM REV CODE 250: Performed by: NURSE ANESTHETIST, CERTIFIED REGISTERED

## 2018-02-06 PROCEDURE — 36000713 HC OR TIME LEV V EA ADD 15 MIN: Performed by: OBSTETRICS & GYNECOLOGY

## 2018-02-06 PROCEDURE — 86901 BLOOD TYPING SEROLOGIC RH(D): CPT

## 2018-02-06 PROCEDURE — 36415 COLL VENOUS BLD VENIPUNCTURE: CPT

## 2018-02-06 PROCEDURE — 71000016 HC POSTOP RECOV ADDL HR: Performed by: OBSTETRICS & GYNECOLOGY

## 2018-02-06 PROCEDURE — C1782 MORCELLATOR: HCPCS | Performed by: OBSTETRICS & GYNECOLOGY

## 2018-02-06 PROCEDURE — 71000015 HC POSTOP RECOV 1ST HR: Performed by: OBSTETRICS & GYNECOLOGY

## 2018-02-06 PROCEDURE — 63600175 PHARM REV CODE 636 W HCPCS: Performed by: OBSTETRICS & GYNECOLOGY

## 2018-02-06 PROCEDURE — 63600175 PHARM REV CODE 636 W HCPCS: Performed by: ANESTHESIOLOGY

## 2018-02-06 PROCEDURE — 37000008 HC ANESTHESIA 1ST 15 MINUTES: Performed by: OBSTETRICS & GYNECOLOGY

## 2018-02-06 PROCEDURE — 71000033 HC RECOVERY, INTIAL HOUR: Performed by: OBSTETRICS & GYNECOLOGY

## 2018-02-06 PROCEDURE — 25000003 PHARM REV CODE 250: Performed by: OBSTETRICS & GYNECOLOGY

## 2018-02-06 PROCEDURE — 63600175 PHARM REV CODE 636 W HCPCS

## 2018-02-06 RX ORDER — PHENYLEPHRINE HYDROCHLORIDE 10 MG/ML
INJECTION INTRAVENOUS
Status: DISCONTINUED | OUTPATIENT
Start: 2018-02-06 | End: 2018-02-06

## 2018-02-06 RX ORDER — SODIUM CHLORIDE 0.9 % (FLUSH) 0.9 %
3 SYRINGE (ML) INJECTION
Status: DISCONTINUED | OUTPATIENT
Start: 2018-02-06 | End: 2018-02-06 | Stop reason: HOSPADM

## 2018-02-06 RX ORDER — SODIUM CHLORIDE 9 MG/ML
INJECTION, SOLUTION INTRAVENOUS CONTINUOUS
Status: DISCONTINUED | OUTPATIENT
Start: 2018-02-06 | End: 2018-02-06 | Stop reason: HOSPADM

## 2018-02-06 RX ORDER — ONDANSETRON 8 MG/1
8 TABLET, ORALLY DISINTEGRATING ORAL EVERY 8 HOURS PRN
Status: DISCONTINUED | OUTPATIENT
Start: 2018-02-06 | End: 2018-02-06 | Stop reason: HOSPADM

## 2018-02-06 RX ORDER — DEXAMETHASONE SODIUM PHOSPHATE 4 MG/ML
INJECTION, SOLUTION INTRA-ARTICULAR; INTRALESIONAL; INTRAMUSCULAR; INTRAVENOUS; SOFT TISSUE
Status: DISCONTINUED | OUTPATIENT
Start: 2018-02-06 | End: 2018-02-06

## 2018-02-06 RX ORDER — ROCURONIUM BROMIDE 10 MG/ML
INJECTION, SOLUTION INTRAVENOUS
Status: DISCONTINUED | OUTPATIENT
Start: 2018-02-06 | End: 2018-02-06

## 2018-02-06 RX ORDER — MIDAZOLAM HYDROCHLORIDE 5 MG/ML
4 INJECTION INTRAMUSCULAR; INTRAVENOUS ONCE AS NEEDED
Status: COMPLETED | OUTPATIENT
Start: 2018-02-06 | End: 2018-02-06

## 2018-02-06 RX ORDER — DIPHENHYDRAMINE HYDROCHLORIDE 50 MG/ML
25 INJECTION INTRAMUSCULAR; INTRAVENOUS EVERY 4 HOURS PRN
Status: DISCONTINUED | OUTPATIENT
Start: 2018-02-06 | End: 2018-02-06 | Stop reason: HOSPADM

## 2018-02-06 RX ORDER — MIDAZOLAM HYDROCHLORIDE 1 MG/ML
INJECTION INTRAMUSCULAR; INTRAVENOUS
Status: DISCONTINUED | OUTPATIENT
Start: 2018-02-06 | End: 2018-02-06

## 2018-02-06 RX ORDER — LIDOCAINE HYDROCHLORIDE 10 MG/ML
0.5 INJECTION, SOLUTION EPIDURAL; INFILTRATION; INTRACAUDAL; PERINEURAL ONCE
Status: DISCONTINUED | OUTPATIENT
Start: 2018-02-07 | End: 2018-02-06 | Stop reason: HOSPADM

## 2018-02-06 RX ORDER — HYDROCODONE BITARTRATE AND ACETAMINOPHEN 5; 325 MG/1; MG/1
1 TABLET ORAL EVERY 4 HOURS PRN
Qty: 14 TABLET | Refills: 0 | Status: SHIPPED | OUTPATIENT
Start: 2018-02-06 | End: 2018-02-22

## 2018-02-06 RX ORDER — ONDANSETRON 2 MG/ML
INJECTION INTRAMUSCULAR; INTRAVENOUS
Status: DISCONTINUED | OUTPATIENT
Start: 2018-02-06 | End: 2018-02-06

## 2018-02-06 RX ORDER — LIDOCAINE HYDROCHLORIDE 10 MG/ML
0.5 INJECTION, SOLUTION EPIDURAL; INFILTRATION; INTRACAUDAL; PERINEURAL ONCE
Status: DISCONTINUED | OUTPATIENT
Start: 2018-02-06 | End: 2018-02-06 | Stop reason: HOSPADM

## 2018-02-06 RX ORDER — SODIUM CHLORIDE, SODIUM LACTATE, POTASSIUM CHLORIDE, CALCIUM CHLORIDE 600; 310; 30; 20 MG/100ML; MG/100ML; MG/100ML; MG/100ML
INJECTION, SOLUTION INTRAVENOUS CONTINUOUS
Status: DISCONTINUED | OUTPATIENT
Start: 2018-02-06 | End: 2018-02-06 | Stop reason: HOSPADM

## 2018-02-06 RX ORDER — ACETAMINOPHEN 10 MG/ML
INJECTION, SOLUTION INTRAVENOUS
Status: DISCONTINUED | OUTPATIENT
Start: 2018-02-06 | End: 2018-02-06

## 2018-02-06 RX ORDER — FENTANYL CITRATE 50 UG/ML
25 INJECTION, SOLUTION INTRAMUSCULAR; INTRAVENOUS EVERY 5 MIN PRN
Status: COMPLETED | OUTPATIENT
Start: 2018-02-06 | End: 2018-02-06

## 2018-02-06 RX ORDER — GLYCOPYRROLATE 0.2 MG/ML
INJECTION INTRAMUSCULAR; INTRAVENOUS
Status: DISCONTINUED | OUTPATIENT
Start: 2018-02-06 | End: 2018-02-06

## 2018-02-06 RX ORDER — ONDANSETRON 2 MG/ML
4 INJECTION INTRAMUSCULAR; INTRAVENOUS DAILY PRN
Status: DISCONTINUED | OUTPATIENT
Start: 2018-02-06 | End: 2018-02-06 | Stop reason: HOSPADM

## 2018-02-06 RX ORDER — MEPERIDINE HYDROCHLORIDE 50 MG/ML
12.5 INJECTION INTRAMUSCULAR; INTRAVENOUS; SUBCUTANEOUS ONCE AS NEEDED
Status: COMPLETED | OUTPATIENT
Start: 2018-02-06 | End: 2018-02-06

## 2018-02-06 RX ORDER — DIPHENHYDRAMINE HYDROCHLORIDE 50 MG/ML
12.5 INJECTION INTRAMUSCULAR; INTRAVENOUS EVERY 30 MIN PRN
Status: DISCONTINUED | OUTPATIENT
Start: 2018-02-06 | End: 2018-02-06 | Stop reason: HOSPADM

## 2018-02-06 RX ORDER — VASOPRESSIN 20 [USP'U]/ML
INJECTION, SOLUTION INTRAMUSCULAR; SUBCUTANEOUS
Status: DISCONTINUED | OUTPATIENT
Start: 2018-02-06 | End: 2018-02-06 | Stop reason: HOSPADM

## 2018-02-06 RX ORDER — FAMOTIDINE 20 MG/1
20 TABLET, FILM COATED ORAL
Status: COMPLETED | OUTPATIENT
Start: 2018-02-06 | End: 2018-02-06

## 2018-02-06 RX ORDER — PROPOFOL 10 MG/ML
VIAL (ML) INTRAVENOUS
Status: DISCONTINUED | OUTPATIENT
Start: 2018-02-06 | End: 2018-02-06

## 2018-02-06 RX ORDER — FENTANYL CITRATE 50 UG/ML
INJECTION, SOLUTION INTRAMUSCULAR; INTRAVENOUS
Status: DISCONTINUED | OUTPATIENT
Start: 2018-02-06 | End: 2018-02-06

## 2018-02-06 RX ORDER — LIDOCAINE HCL/PF 100 MG/5ML
SYRINGE (ML) INTRAVENOUS
Status: DISCONTINUED | OUTPATIENT
Start: 2018-02-06 | End: 2018-02-06

## 2018-02-06 RX ORDER — NEOSTIGMINE METHYLSULFATE 1 MG/ML
INJECTION, SOLUTION INTRAVENOUS
Status: DISCONTINUED | OUTPATIENT
Start: 2018-02-06 | End: 2018-02-06

## 2018-02-06 RX ORDER — SODIUM CHLORIDE, SODIUM LACTATE, POTASSIUM CHLORIDE, CALCIUM CHLORIDE 600; 310; 30; 20 MG/100ML; MG/100ML; MG/100ML; MG/100ML
INJECTION, SOLUTION INTRAVENOUS CONTINUOUS
Status: DISCONTINUED | OUTPATIENT
Start: 2018-02-07 | End: 2018-02-06 | Stop reason: HOSPADM

## 2018-02-06 RX ORDER — OXYCODONE HYDROCHLORIDE 5 MG/1
5 TABLET ORAL
Status: DISCONTINUED | OUTPATIENT
Start: 2018-02-06 | End: 2018-02-06 | Stop reason: HOSPADM

## 2018-02-06 RX ORDER — HYDROMORPHONE HYDROCHLORIDE 2 MG/ML
0.4 INJECTION, SOLUTION INTRAMUSCULAR; INTRAVENOUS; SUBCUTANEOUS EVERY 5 MIN PRN
Status: DISCONTINUED | OUTPATIENT
Start: 2018-02-06 | End: 2018-02-06 | Stop reason: HOSPADM

## 2018-02-06 RX ORDER — HYDROCODONE BITARTRATE AND ACETAMINOPHEN 5; 325 MG/1; MG/1
1 TABLET ORAL EVERY 4 HOURS PRN
Status: DISCONTINUED | OUTPATIENT
Start: 2018-02-06 | End: 2018-02-06 | Stop reason: HOSPADM

## 2018-02-06 RX ORDER — OXYCODONE HYDROCHLORIDE 5 MG/1
5 TABLET ORAL ONCE AS NEEDED
Status: DISCONTINUED | OUTPATIENT
Start: 2018-02-06 | End: 2018-02-06 | Stop reason: HOSPADM

## 2018-02-06 RX ADMIN — PHENYLEPHRINE HYDROCHLORIDE 100 MCG: 10 INJECTION INTRAVENOUS at 01:02

## 2018-02-06 RX ADMIN — FENTANYL CITRATE 50 MCG: 50 INJECTION, SOLUTION INTRAMUSCULAR; INTRAVENOUS at 02:02

## 2018-02-06 RX ADMIN — OXYCODONE HYDROCHLORIDE 5 MG: 5 TABLET ORAL at 04:02

## 2018-02-06 RX ADMIN — FENTANYL CITRATE 50 MCG: 50 INJECTION, SOLUTION INTRAMUSCULAR; INTRAVENOUS at 12:02

## 2018-02-06 RX ADMIN — FENTANYL CITRATE 25 MCG: 50 INJECTION, SOLUTION INTRAMUSCULAR; INTRAVENOUS at 04:02

## 2018-02-06 RX ADMIN — SODIUM CHLORIDE, SODIUM LACTATE, POTASSIUM CHLORIDE, AND CALCIUM CHLORIDE: 600; 310; 30; 20 INJECTION, SOLUTION INTRAVENOUS at 09:02

## 2018-02-06 RX ADMIN — MIDAZOLAM HYDROCHLORIDE 2 MG: 1 INJECTION, SOLUTION INTRAMUSCULAR; INTRAVENOUS at 10:02

## 2018-02-06 RX ADMIN — LIDOCAINE HYDROCHLORIDE 100 MG: 20 INJECTION, SOLUTION INTRAVENOUS at 12:02

## 2018-02-06 RX ADMIN — SODIUM CHLORIDE, SODIUM LACTATE, POTASSIUM CHLORIDE, AND CALCIUM CHLORIDE: 600; 310; 30; 20 INJECTION, SOLUTION INTRAVENOUS at 01:02

## 2018-02-06 RX ADMIN — PROPOFOL 250 MG: 10 INJECTION, EMULSION INTRAVENOUS at 12:02

## 2018-02-06 RX ADMIN — FENTANYL CITRATE 50 MCG: 50 INJECTION, SOLUTION INTRAMUSCULAR; INTRAVENOUS at 03:02

## 2018-02-06 RX ADMIN — NEOSTIGMINE METHYLSULFATE 5 MG: 1 INJECTION INTRAVENOUS at 03:02

## 2018-02-06 RX ADMIN — ROCURONIUM BROMIDE 10 MG: 10 INJECTION INTRAVENOUS at 02:02

## 2018-02-06 RX ADMIN — ONDANSETRON 4 MG: 2 INJECTION INTRAMUSCULAR; INTRAVENOUS at 02:02

## 2018-02-06 RX ADMIN — ROCURONIUM BROMIDE 10 MG: 10 INJECTION INTRAVENOUS at 01:02

## 2018-02-06 RX ADMIN — DEXAMETHASONE SODIUM PHOSPHATE 8 MG: 4 INJECTION, SOLUTION INTRAMUSCULAR; INTRAVENOUS at 12:02

## 2018-02-06 RX ADMIN — CARBOXYMETHYLCELLULOSE SODIUM 4 DROP: 2.5 SOLUTION/ DROPS OPHTHALMIC at 12:02

## 2018-02-06 RX ADMIN — PROPOFOL 50 MG: 10 INJECTION, EMULSION INTRAVENOUS at 02:02

## 2018-02-06 RX ADMIN — ACETAMINOPHEN 1000 MG: 10 INJECTION, SOLUTION INTRAVENOUS at 01:02

## 2018-02-06 RX ADMIN — MIDAZOLAM HYDROCHLORIDE 4 MG: 5 INJECTION, SOLUTION INTRAMUSCULAR; INTRAVENOUS at 07:02

## 2018-02-06 RX ADMIN — FENTANYL CITRATE 150 MCG: 50 INJECTION, SOLUTION INTRAMUSCULAR; INTRAVENOUS at 12:02

## 2018-02-06 RX ADMIN — FAMOTIDINE 20 MG: 20 TABLET, FILM COATED ORAL at 07:02

## 2018-02-06 RX ADMIN — GLYCOPYRROLATE 0.8 MG: 0.2 INJECTION, SOLUTION INTRAMUSCULAR; INTRAVENOUS at 03:02

## 2018-02-06 RX ADMIN — DEXTROSE 3 G: 50 INJECTION, SOLUTION INTRAVENOUS at 03:02

## 2018-02-06 RX ADMIN — ROCURONIUM BROMIDE 50 MG: 10 INJECTION INTRAVENOUS at 12:02

## 2018-02-06 RX ADMIN — MEPERIDINE HYDROCHLORIDE 12.5 MG: 50 INJECTION INTRAMUSCULAR; INTRAVENOUS; SUBCUTANEOUS at 03:02

## 2018-02-06 RX ADMIN — GLYCOPYRROLATE 0.2 MG: 0.2 INJECTION, SOLUTION INTRAMUSCULAR; INTRAVENOUS at 12:02

## 2018-02-06 RX ADMIN — CEFAZOLIN SODIUM 150 ML: 2 SOLUTION INTRAVENOUS at 12:02

## 2018-02-06 NOTE — H&P (VIEW-ONLY)
CC: Preop exam    Lore Garcia is a 32 y.o. female  presents for a pre-op exam for a Robotic assisted laparoscopic myomectomy scheduled on .      Patient had a previous pregnancy.  Around 19 weeks, she began having severe pelvic pain on a Tuesday.  She was examined in clinic and in ultrasound and cervix was found to be closed per patient.  By , pain became more severe.  She was seen in the ED and found to have a bulging bag of water.  She delivered soon after.  Prior to pregnancy, she did not know that she had fibroids.       Patient reports cycles are regular but heavy.  She has to use pads and tampons. She reports clots.  She also reports accidents.  She reports minimal dysmenorrhea.       MRI shows:     Uterus: Enlarged, measuring 15.5 x 8.8 x 8.0 cm with lobulated contour containing multiple fibroids.  Fibroids are primarily intramural and subserosal in location, with 3 of the smaller fibroids directly abutting the endometrium and a questionably submucosal location; the largest of these 3 measures 2.3 x 2.8 x 2.1 cm anteriorly along the uterine body.  The largest fibroid overall is subserosal in location and measures 8.6 x 7.8 x 7.3 cm at the uterine fundus.  Mild heterogeneous enhancement is present within a few of the smaller fibroids, though overall there is no enhancement.  Fibroids are primarily heterogeneously T2 hypointense without substantial enhancement.  A pedunculated subserosal fibroid along the posterior uterine body/fundus measuring 2.2 x 2.0 x 2.9 cm has small amount of intrinsic T2 hyperintensity corresponding to an area of non-enhancement suggesting component of myxoid degeneration.  The endometrial stripe is distorted and displaced by the fibroids and measures up to 10 mm in thickness.    Right ovary: 2.3 x 1.4 x 2.6 cm with normal appearance.    Left ovary: 2.5 x 2.2 x 1.6 cm with normal appearance.    Other intra-abdominal structures: No obvious abnormality.  " Trace free fluid.    Bones: Normal marrow signal.  Mild disc bulge L5-S1 without spinal canal stenosis but with suggestion of left-sided foraminal stenosis.     From my review of images, 6 fibroids are seen 5 in the anterior portion of the uterus, 1 posterior fibroid.  She desires to conceive again.      Past Medical History:   Diagnosis Date    Hypertension     Thyroid disease        Past Surgical History:   Procedure Laterality Date    THYROIDECTOMY, PARTIAL Left        OB History    Para Term  AB Living   1 0   0 1 0   SAB TAB Ectopic Multiple Live Births   1       0      # Outcome Date GA Lbr Fran/2nd Weight Sex Delivery Anes PTL Lv   1 SAB 17 20w0d  0.283 kg (10 oz) F Vag-Spont  N FD      Complications: Abruptio Placenta          Family History   Problem Relation Age of Onset    Breast cancer Neg Hx     Colon cancer Neg Hx     Ovarian cancer Neg Hx        Social History   Substance Use Topics    Smoking status: Never Smoker    Smokeless tobacco: Never Used    Alcohol use No       BP (!) 146/102 (BP Location: Left arm, Patient Position: Sitting, BP Method: Large (Manual))   Ht 5' 5" (1.651 m)   Wt 124.5 kg (274 lb 7.6 oz)   LMP 2018 (Exact Date)   BMI 45.67 kg/m²     ROS:  GENERAL: Denies weight gain or weight loss. Feeling well overall.   SKIN: Denies rash or lesions.   HEAD: Denies head injury or headache.   NODES: Denies enlarged lymph nodes.   CHEST: Denies chest pain or shortness of breath.   CARDIOVASCULAR: Denies palpitations or left sided chest pain.   ABDOMEN: No abdominal pain, constipation, diarrhea, nausea, vomiting or rectal bleeding.   URINARY: No frequency, dysuria, hematuria, or burning on urination.  REPRODUCTIVE: See HPI.   HEMATOLOGIC: No easy bruisability or excessive bleeding with the exception of menstrual cycles.  MUSCULOSKELETAL: Denies joint pain or swelling.   NEUROLOGIC: Denies syncope or weakness.   PSYCHIATRIC: Denies depression, anxiety or " mood swings.    PHYSICAL EXAM:  APPEARANCE: Well nourished, well developed, in no acute distress.  AFFECT: WNL, alert and oriented x 3  SKIN: No acne or hirsutism  NECK: Neck symmetric without masses or thyromegaly  NODES: No inguinal, cervical, axillary, or femoral lymph node enlargement  CHEST: Good respiratory effect  ABDOMEN: Soft.  No tenderness or masses.  No hepatosplenomegaly.  No hernias.  PELVIC: Deferred  EXTREMITIES: No edema.      ICD-10-CM ICD-9-CM    1. Preoperative exam for gynecologic surgery Z01.818 V72.83    2. Fibroids, intramural D25.1 218.1    3. Obesity, unspecified classification, unspecified obesity type, unspecified whether serious comorbidity present E66.9 278.00    4. Hypertension, unspecified type I10 401.9         Patient was counseled today on fibroids and affect on fertility and pregnancy.  Discussed that previous second trimester loss may or may not have been due to fibroids, though it does sound like she may have had degenerative changes that caused labor.  Discussed management of fibroids and the risks/benefits of removing fibroids.  Patient desires removal.  Discussed routes of myomectomy - robotically assisted vs. Open myomectomy.  Discussed the benefits of a minimally invasive option as well as the limitations (i.e. Not being able to remove all fibroids).  Discussed obesity and risk of complications both laparoscopically and open specific to obesity.  Discussed the risk of recurrence of fibroids, the need to delay conception for 4-6 months and the need for  for delivery.       Patient desires to proceed with a robotically assisted laparoscopic approach.  Surgery scheduled .      Discussed hypertension.  She sees Dr. Juan Shi in [facility] as a PCP.  Medication has not been recommended by her PCP.  I recommend f/u with him.        I have discussed the risks, benefits, indications, and alternatives of the procedure in detail.  The patient verbalizes her  understanding.  All questions answered.  Consents signed.  The patient agrees to proceed to proceed as planned.

## 2018-02-06 NOTE — OP NOTE
OPERATIVE REPORT     DATE: (date: 02/06/2018)    PREOPERATIVE DIAGNOSIS  1. Fibroids  2. Obesity    POSTOPERATIVE DIAGNOSIS  1.Fibroids  2.Obesity    PROCEDURE:  1. Robotic assisted laparoscopic myomectomy (6 fibroids)    SURGEON: Dr. Elicai Ruiz    ASSISTANT: Dr. King Mendez    ANESTHESIA: General    COMPLICATIONS: None    EBL: 100 cc    IV FLUIDS: 1000 cc    URINE OUTPUT: 400 cc    FINDINGS: Enlarged uterus with multiple fibroids - large fundal fibroid, 3 small fundal intramural fibroids, 1 small posterior subserosal fibroids, 1 pedunculated posterior fibroid.      PROCEDURE: Patient was taking to the operating room where general anesthesia was administered and found to be adequate.  She was prepped and draped in the dorsal lithotomy position.  A weighted sterile speculum was placed in the vagina.  The anterior lip of the cervix was grasped with a single tooth tenaculum.  The uterus was sounded to approximately 8 cm.  A CASSIUS manipulator was placed inside the uterine cavity.  A jay catheter was inserted into the bladder.    Gloves were changed.  A Veress needle was inserted into the umbilicus under tenting of the anterior abdominal wall.  Placement into the peritoneal cavity was confirmed via saline drop test.  The abdomen was insufflated to 15mm Hg using Carbon dioxide.  A 8 mm supraumbilical skin incision was made with the scalpel.  A 8 mm trocar was advanced through this incision.  The camera was placed through the trocar.  Excellent hemostasis was noted.  The patient was placed in deep Trendelenburg.  An 8 mm left lateral skin incision was made with a scalpel and an 8 mm trocar was advanced through this incision under visualization of the camera.  A 12 mm left upper quadrant incision was made with the scalpel.  A 12 mm AirSeal trocar was advanced through the incision under visualization of the camera.  An 8 mm right lateral skin incision was made with the scalpel.  An 8 mm trocar was advanced through  "this incision under visualization of the camera.  Attention was turned to the right upper quadrant.  An 8 mm skin incision was made with the scalpel and an 8 mm trocar was advanced through this incision under visualization of the camera.  Excellent hemostasis was noted.  The robot was docked into place.  Instruments were placed.  The surgeon moved to the console for the procedure.      The uterus was inspected.  Multiple fibroids were noted as above.  All fibroids were removed in the following fashion: 20 Units of Pitressin diluted in 100cc of Normal saline was injected into the serosa overlying the fibroid.  An incision was made with the monopolar scissors over the serosa of the fibroid.  .  This incision was carried down to the fibroid with the monopolar scissors.  The fibroid was grasped with a laparoscopic single tooth tenaculum.  Using the bipolar [place] and the bipolar scissors, the fibroid was enucleated from the underlying myometrium.  Hemostasis was maintained utilizing the laser.  Once the fibroid was completely enucleated, it was placed along a suture and placed in the pericolic gutter for removal later.  The deepest layer of myometrium was reapproximated with 180 V-Lock suture in a running fashion.  The next layer of myometrium was reapproximated with 180 V-Lock suture in a running fashion.  The serosa was reapproximated with 180 V-Lock in a baseball stitch fashion.  Excellent hemostasis was noted.      Upon removal of the six fibroids listed above, a laparoscopic ultrasound was performed.  No further fibroids were identified.      The uterus was copiously irrigated.  Excellent hemostasis was noted.  The 12 mm umbilical trocar was removed and a morcellator was advanced through this incision under visualization of the camera.  The suture of the "string-of-pearls" was grasped with a grasper and needle .  The suture was cut and the needle was removed through the trocar.  Using morcellation, the " fibroids were removed from the abdominal cavity.  The abdomen was inspected and found to be free of injury and all pieces of fibroid had been removed. Vitagel was placed inside the abdominal cavity over the uterus.  The fascia of the 12 mm incisions was re-approximated with 0-Vicryl in a Luís-Darek fashion.  The abdomen was deflated and all trocars were removed.  The skin was closed with 4-0 Monocryl in a subcuticular fashion.  The CASSIUS and jay were removed.  Sponge, lap, and needle counts were correct x 2.  The patient was taken to the recovery room in stable condition.          Elicia Ruiz MD, FACOG  Obstetrics and Gynecology

## 2018-02-06 NOTE — TRANSFER OF CARE
"Anesthesia Transfer of Care Note    Patient: Lore WELLINGTON Garcia    Procedure(s) Performed: Procedure(s) (LRB):  ROBOTIC ASSISTED LAPAROSCOPIC MYOMECTOMY (N/A)    Patient location: PACU    Anesthesia Type: general    Transport from OR: Transported from OR on 2-3 L/min O2 by NC with adequate spontaneous ventilation    Post pain: adequate analgesia    Post assessment: no apparent anesthetic complications    Post vital signs: stable    Level of consciousness: awake    Nausea/Vomiting: no nausea/vomiting    Complications: none    Transfer of care protocol was followed      Last vitals:   Visit Vitals  BP (!) 150/90   Pulse 90   Temp 36.7 °C (98 °F)   Resp 18   Ht 5' 5" (1.651 m)   Wt 124.3 kg (274 lb)   LMP 01/20/2018 (Exact Date)   SpO2 99%   BMI 45.60 kg/m²     "

## 2018-02-06 NOTE — INTERVAL H&P NOTE
The patient has been examined and the H&P has been reviewed:    I concur with the findings and no changes have occurred since H&P was written.    Anesthesia/Surgery risks, benefits and alternative options discussed and understood by patient/family.          Active Hospital Problems    Diagnosis  POA    Fibroids, intramural [D25.1]  Yes      Resolved Hospital Problems    Diagnosis Date Resolved POA   No resolved problems to display.

## 2018-02-06 NOTE — ANESTHESIA POSTPROCEDURE EVALUATION
"Anesthesia Post Evaluation    Patient: Lore WELLINGTON Garcia    Procedure(s) Performed: Procedure(s) (LRB):  ROBOTIC ASSISTED LAPAROSCOPIC MYOMECTOMY (N/A)    Final Anesthesia Type: general  Patient location during evaluation: PACU  Patient participation: Yes- Able to Participate  Level of consciousness: awake and alert  Post-procedure vital signs: reviewed and stable  Pain management: adequate  Airway patency: patent  PONV status at discharge: No PONV  Anesthetic complications: no      Cardiovascular status: blood pressure returned to baseline and stable  Respiratory status: unassisted, spontaneous ventilation and room air  Hydration status: euvolemic  Follow-up not needed.        Visit Vitals  BP (!) 164/90   Pulse 77   Temp 36.7 °C (98 °F) (Oral)   Resp 18   Ht 5' 5" (1.651 m)   Wt 124.3 kg (274 lb)   LMP 01/20/2018 (Exact Date)   SpO2 98%   BMI 45.60 kg/m²       Pain/Jennifer Score: Pain Assessment Performed: Yes (2/6/2018  3:40 PM)  Presence of Pain: complains of pain/discomfort (2/6/2018  4:07 PM)  Pain Rating Prior to Med Admin: 6 (2/6/2018  4:33 PM)  Pain Rating Post Med Admin: 4 (2/6/2018  5:15 PM)  Jennifer Score: 10 (2/6/2018  5:15 PM)      "

## 2018-02-06 NOTE — BRIEF OP NOTE
BRIEF OPERATIVE NOTE       SUMMARY      Surgery Date: 02/06/2018     SURGEON: Elicia Ruiz    ASSISTANT: Dr. King Mendez    PREOP DIAGNOSIS:   1. Fibroids  2. Obesity    POSTOP DIAGNOSIS:    1. Fibroids  2. Obesity    PROCEDURES: Robotic assisted laparoscopic myomectomy (6 fibroids)    ANESTHESIA: general    FINDINGS/KEY COMPONENTS: Enlarged uterus with multiple fibroids - large fundal fibroid, 3 small fundal intramural fibroids, 1 small posterior subserosal fibroids, 1 pedunculated posterior fibroid.      ESTIMATED BLOOD LOSS: 100 cc    COMPLICATIONS: None    PATHOLOGY:   Specimens     Start     Ordered    02/06/18 1511  Specimen to Pathology - Surgery  Once      02/06/18 1511

## 2018-02-07 PROCEDURE — 88305 TISSUE EXAM BY PATHOLOGIST: CPT | Performed by: PATHOLOGY

## 2018-02-07 PROCEDURE — 88305 TISSUE EXAM BY PATHOLOGIST: CPT | Mod: 26,,, | Performed by: PATHOLOGY

## 2018-02-07 NOTE — DISCHARGE SUMMARY
[facility]  Discharge Summary  Gynecology      Admit Date: 2/6/2018    Discharge Date and Time:  02/06/2018 6:05 PM    Attending Physician: Elicia Ruiz MD     Discharge Provider: DAVID Mendez    Reason for Admission: Robotic assisted myomectomy    Procedures Performed: Procedure(s) (LRB):  ROBOTIC ASSISTED LAPAROSCOPIC MYOMECTOMY (N/A)    Hospital Course: Patient presented for scheduled procedure. Patient was passed back to OR for Robotic assisted myomectomy. Please see OP note for further details. Tolerated procedure well and patient was taken to recovery in a stable condition. Prior to discharge patient was able to void, ambulate, tolerate PO and pain was well controlled with PO meds. Patient was given routine post-op instructions for which patient voiced understanding. Patient was subsequently discharged home.      Final Diagnoses:   Principal Problem: S/P myomectomy   Secondary Diagnoses:   Active Hospital Problems    Diagnosis  POA    *S/P Robotic-Assisted myomectomy [Z98.890]  Not Applicable    Fibroids, intramural [D25.1]  Yes      Resolved Hospital Problems    Diagnosis Date Resolved POA   No resolved problems to display.       Discharged Condition: good    Disposition: Home or Self Care    Follow Up/Patient Instructions:     Medications:  Reconciled Home Medications:   Current Discharge Medication List      START taking these medications    Details   hydrocodone-acetaminophen 5-325mg (NORCO) 5-325 mg per tablet Take 1 tablet by mouth every 4 (four) hours as needed for Pain.  Qty: 14 tablet, Refills: 0         CONTINUE these medications which have NOT CHANGED    Details   MULTIVITAMIN ORAL Take by mouth once daily.             Discharge Procedure Orders  Diet general     Lifting restrictions   Order Comments: Nothing heavier than gallon of milk x 2 weeks     Call MD for:  temperature >100.4     Call MD for:  persistent nausea and vomiting     Call MD for:  severe  uncontrolled pain     Call MD for:  difficulty breathing, headache or visual disturbances     Call MD for:  redness, tenderness, or signs of infection (pain, swelling, redness, odor or green/yellow discharge around incision site)     Call MD for:  persistent dizziness or light-headedness     Call MD for:  extreme fatigue       Follow-up Information     Schedule an appointment as soon as possible for a visit with Elicia Ruiz MD.    Specialties:  Obstetrics, Obstetrics and Gynecology  Why:  Post-Op  Contact information:  [address] [id number] [phone]                   Genoveva Mendez MD   PGY-3 Ob-gyn

## 2018-02-07 NOTE — PLAN OF CARE
[facility] Garcia has met all discharge criteria from Phase II. Vital Signs are stable, ambulating  without difficulty. Discharge instructions given, patient verbalized understanding. Discharged from facility via wheelchair in stable condition.

## 2018-02-07 NOTE — DISCHARGE INSTRUCTIONS
Understanding Robotic-Assisted Myomectomy  Robotic-assisted myomectomy is a type of surgery. Its done to remove growths in a womens womb (uterus) called fibroid tumors. The tumors are not cancer. The surgery is done with special tools and a robotic controller.  What are fibroids?  The uterus is the organ where a baby grows during pregnancy. Its in the lower belly (abdomen). Fibroid tumors are a very common type of growth in the uterus. They are also called leiomyomas or myomas. They are not cancer. They vary in size and number. A woman may have 1 or more fibroid tumors. They may be very small or as large as a grapefruit. A womans risk of having fibroid tumors increases as she gets older, until she no longer has menstrual periods (menopause).  Why robotic-assisted myomectomy is done  You might need this surgery if you have fibroids that cause symptoms such as:  · Heavy and long-lasting bleeding during your period  · Lower belly (pelvic) pain  · Pressure on the bladder or bowels  · Trouble getting or staying pregnant  This is one type of surgery to remove fibroids. It is done with smaller incisions than standard surgery. This is known as minimally invasive surgery. It has some benefits over other surgery to remove fibroids. They may include:  · Lower risk for complications (for example, less bleeding during surgery)  · [place] hospital stay  · Faster recovery  · Uterus is left in place  But robotic-assisted myomectomy may:  · Take longer than other surgeries  · Not be available where you live  · Cost more  Your healthcare provider can help you decide which surgery will work best for you.  How robotic-assisted myomectomy is done   The surgery will be done by an obstetrician/gynecologist (OB/GYN) surgeon. It can be done in several ways. The surgeon will make a few small cuts (incisions) in your abdomen. He or she will pass tools through the small incisions. These include a tiny camera with a light, and several  robotic tools. The surgeon will use a robotic controller to move the tools and remove the fibroids.  Risks of robotic-assisted myomectomy  Every surgery has risks. Risks of robotic-assisted myomectomy include:  · Infection  · Bleeding  · Blood clots  · Injury to nearby organs  · Problems with a future pregnancy  · Reaction to anesthesia  · Return of fibroids after surgery  Your risks may vary depending on your age and overall health. They also depend on the number and location of the fibroids. Talk with your healthcare provider about which risks apply most to you.  Date Last Reviewed: 5/6/2015  © 5687-8018 [employer]. [address] [place] [address], [place] [address]. All rights reserved. This information is not intended as a substitute for professional medical care. Always follow your healthcare professional's instructions.        Anesthesia: After Your Surgery  Youve just had surgery. During surgery, you received medication called anesthesia to keep you comfortable and pain-free. After surgery, you may experience some pain or nausea. This is common. Here are some tips for feeling better and recovering after surgery.    Going home  Your doctor or nurse will show you how to take care of yourself when you go home. He or she will also answer your questions. Have an adult family member or friend drive you home. For the first 24 hours after your surgery:  · Do not drive or use heavy equipment.  · Do not make important decisions or sign legal documents.  · Avoid alcohol.  · Have someone stay with you, if needed. He or she can watch for problems and help keep you safe.  Be sure to keep all follow-up appointments with your doctor. And rest after your procedure for as long as your doctor tells you to.    Coping with pain  If you have pain after surgery, pain medication will help you feel better. Take it as directed, before pain becomes severe. Also, ask your doctor or pharmacist about other ways to control pain,  such as with heat, ice, and relaxation. And follow any other instructions your surgeon or nurse gives you.    Tips for taking pain medication  To get the best relief possible, remember these points:  · Pain medications can upset your stomach. Taking them with a little food may help.  · Most pain relievers taken by mouth need at least 20 to 30 minutes to take effect.  · Taking medication on a schedule can help you remember to take it. Try to time your medication so that you can take it before beginning an activity, such as dressing, walking, or sitting down for dinner.  · Constipation is a common side effect of pain medications. Contact your doctor before taking any medications like laxatives or stool softeners to help relieve constipation. Also ask about any dietary restrictions, because drinking lots of fluids and eating foods like fruits and vegetables that are high in fiber can also help. Remember, dont take laxatives unless your surgeon has prescribed them.  · Mixing alcohol and pain medication can cause dizziness and slow your breathing. It can even be fatal. Dont drink alcohol while taking pain medication.  · Pain medication can slow your reflexes. Dont drive or operate machinery while taking pain medication.  If your health care provider tells you to take acetaminophen to help relieve your pain, ask him or her how much you are supposed to take each day. (Acetaminophen is the generic name for Tylenol and other brand-name pain relievers.) Acetaminophen or other pain relievers may interact with your prescription medicines or other over-the-counter (OTC) drugs. Some prescription medications contain acetaminophen along with other active ingredients. Using both prescription and OTC acetaminophen for pain can cause you to overdose. The FDA recommends that you read the labels on your OTC medications carefully. This will help you to clearly understand the list of active ingredients, dosing instructions, and  any warnings. It may also help you avoid taking too much acetaminophen. If you have questions or don't understand the information, ask your pharmacist or health care provider to explain it to you before you take the OTC medication.    Managing nausea  Some people have an upset stomach after surgery. This is often due to anesthesia, pain, pain medications, or the stress of surgery. The following tips will help you manage nausea and get good nutrition as you recover. If you were on a special diet before surgery, ask your doctor if you should follow it during recovery. These tips may help:  · Dont push yourself to eat. Your body will tell you when to eat and how much.  · Start off with clear liquids and soup. They are easier to digest.  · Progress to semi-solid foods (mashed potatoes, applesauce, and gelatin) as you feel ready.  · Slowly move to solid foods. Dont eat fatty, rich, or spicy foods at first.  · Dont force yourself to have three large meals a day. Instead, eat smaller amounts more often.  · Take pain medications with a small amount of solid food, such as crackers or toast to avoid nausea.      Call your surgeon if  · You still have pain an hour after taking medication (it may not be strong enough).  · You feel too sleepy, dizzy, or groggy (medication may be too strong).  · You have side effects like nausea, vomiting, or skin changes (rash, itching, or hives).   © 1226-4128 [employer]. [address] [place] [address], [place] [address]. All rights reserved. This information is not intended as a substitute for professional medical care. Always follow your healthcare professional's instructions.

## 2018-02-12 ENCOUNTER — TELEPHONE (OUTPATIENT)
Dept: OBSTETRICS AND GYNECOLOGY | Facility: CLINIC | Age: 33
End: 2018-02-12

## 2018-02-17 ENCOUNTER — PATIENT MESSAGE (OUTPATIENT)
Dept: OBSTETRICS AND GYNECOLOGY | Facility: CLINIC | Age: 33
End: 2018-02-17

## 2018-02-22 ENCOUNTER — OFFICE VISIT (OUTPATIENT)
Dept: OBSTETRICS AND GYNECOLOGY | Facility: CLINIC | Age: 33
End: 2018-02-22
Attending: OBSTETRICS & GYNECOLOGY
Payer: COMMERCIAL

## 2018-02-22 VITALS
BODY MASS INDEX: 44.48 KG/M2 | DIASTOLIC BLOOD PRESSURE: 90 MMHG | WEIGHT: 267 LBS | HEIGHT: 65 IN | SYSTOLIC BLOOD PRESSURE: 132 MMHG

## 2018-02-22 DIAGNOSIS — Z09 POSTOP CHECK: Primary | ICD-10-CM

## 2018-02-22 PROCEDURE — 99024 POSTOP FOLLOW-UP VISIT: CPT | Mod: S$GLB,,, | Performed by: OBSTETRICS & GYNECOLOGY

## 2018-02-22 PROCEDURE — 99999 PR PBB SHADOW E&M-EST. PATIENT-LVL II: CPT | Mod: PBBFAC,,, | Performed by: OBSTETRICS & GYNECOLOGY

## 2018-02-22 RX ORDER — LISINOPRIL 20 MG/1
TABLET ORAL
COMMUNITY
Start: 2018-01-30 | End: 2018-08-09

## 2018-02-22 RX ORDER — METFORMIN HYDROCHLORIDE 500 MG/1
TABLET ORAL
COMMUNITY
Start: 2018-01-30 | End: 2019-01-28 | Stop reason: SDUPTHER

## 2018-02-22 NOTE — PROGRESS NOTES
"CC: Postoperative visit    Lore Garcia is a 32 y.o. female  presents for a postoperative visit s/p Robotic assisted laparoscopic myomectomy on 2018.  Her postoperative course was uncomplicated.  She is doing well postoperative.    Pathology showed:  FINAL PATHOLOGIC DIAGNOSIS  LEIOMYOMATA WITH NECROSIS.    BP (!) 132/90   Ht 5' 5" (1.651 m)   Wt 121.1 kg (266 lb 15.6 oz)   LMP 2018 (Exact Date)   BMI 44.43 kg/m²     ROS:  GENERAL: No fever, chills, fatigability or weight loss.  VULVAR: No pain, no lesions and no itching.  VAGINAL: No relaxation, no itching, no discharge, no abnormal bleeding and no lesions.  ABDOMEN: No abdominal pain. Denies nausea. Denies vomiting. No diarrhea. No constipation  BREAST: Denies pain. No lumps. No discharge.  URINARY: No incontinence, no nocturia, no frequency and no dysuria.  CARDIOVASCULAR: No chest pain. No shortness of breath. No leg cramps.  NEUROLOGICAL: No headaches. No vision changes.    Physical Exam    INCISION: Clean, dry, intact.  Well healed.   PELVIC: Normal external genitalia without lesions.  Normal hair distribution.  Adequate perineal body, normal urethral meatus.  Vagina moist and well rugated without lesions or discharge.  Cervix pink, without lesions, discharge or tenderness.  No significant cystocele or rectocele.  Bimanual exam shows uterus to be normal size, regular, mobile and nontender.  Adnexa without masses or tenderness.      1. Postop check         Patient can return to normal activities.  Return to clinic in 1 year for well woman exam.    "

## 2018-08-06 ENCOUNTER — TELEPHONE (OUTPATIENT)
Dept: OBSTETRICS AND GYNECOLOGY | Facility: CLINIC | Age: 33
End: 2018-08-06

## 2018-08-06 ENCOUNTER — PATIENT MESSAGE (OUTPATIENT)
Dept: MATERNAL FETAL MEDICINE | Facility: CLINIC | Age: 33
End: 2018-08-06

## 2018-08-06 NOTE — TELEPHONE ENCOUNTER
----- Message from Farrukh Dos Santos sent at 8/6/2018  3:56 PM CDT -----  Contact: pt      Can the clinic reply in [facility]: no    Who Called: pt    Date of Positive Preg Test: 08/05/18    1st day of Last Menstrual Cycle: 07/07/18    List Any Difficulties: brownish discharge    What Number to Call Back: [phone]

## 2018-08-09 ENCOUNTER — OFFICE VISIT (OUTPATIENT)
Dept: OBSTETRICS AND GYNECOLOGY | Facility: CLINIC | Age: 33
End: 2018-08-09
Payer: COMMERCIAL

## 2018-08-09 ENCOUNTER — LAB VISIT (OUTPATIENT)
Dept: LAB | Facility: OTHER | Age: 33
End: 2018-08-09
Payer: COMMERCIAL

## 2018-08-09 VITALS
BODY MASS INDEX: 41.87 KG/M2 | SYSTOLIC BLOOD PRESSURE: 156 MMHG | DIASTOLIC BLOOD PRESSURE: 92 MMHG | WEIGHT: 251.31 LBS | HEIGHT: 65 IN

## 2018-08-09 DIAGNOSIS — O99.211 OBESITY AFFECTING PREGNANCY IN FIRST TRIMESTER: ICD-10-CM

## 2018-08-09 DIAGNOSIS — O09.299 PRIOR PREGNANCY WITH FETAL DEMISE: ICD-10-CM

## 2018-08-09 DIAGNOSIS — Z32.01 POSITIVE PREGNANCY TEST: ICD-10-CM

## 2018-08-09 DIAGNOSIS — O10.919 CHRONIC HYPERTENSION AFFECTING PREGNANCY: ICD-10-CM

## 2018-08-09 DIAGNOSIS — N91.2 AMENORRHEA: Primary | ICD-10-CM

## 2018-08-09 DIAGNOSIS — N91.2 AMENORRHEA: ICD-10-CM

## 2018-08-09 PROBLEM — D25.1 FIBROIDS, INTRAMURAL: Status: RESOLVED | Noted: 2018-02-06 | Resolved: 2018-08-09

## 2018-08-09 LAB
ABO + RH BLD: NORMAL
ANION GAP SERPL CALC-SCNC: 12 MMOL/L
B-HCG UR QL: POSITIVE
BASOPHILS # BLD AUTO: 0.03 K/UL
BASOPHILS NFR BLD: 0.5 %
BLD GP AB SCN CELLS X3 SERPL QL: NORMAL
BUN SERPL-MCNC: 7 MG/DL
CALCIUM SERPL-MCNC: 9.7 MG/DL
CHLORIDE SERPL-SCNC: 104 MMOL/L
CO2 SERPL-SCNC: 22 MMOL/L
CREAT SERPL-MCNC: 0.8 MG/DL
CTP QC/QA: YES
DIFFERENTIAL METHOD: ABNORMAL
EOSINOPHIL # BLD AUTO: 0.2 K/UL
EOSINOPHIL NFR BLD: 3.1 %
ERYTHROCYTE [DISTWIDTH] IN BLOOD BY AUTOMATED COUNT: 13.8 %
EST. GFR  (AFRICAN AMERICAN): >60 ML/MIN/1.73 M^2
EST. GFR  (NON AFRICAN AMERICAN): >60 ML/MIN/1.73 M^2
ESTIMATED AVG GLUCOSE: 111 MG/DL
GLUCOSE SERPL-MCNC: 93 MG/DL
HBA1C MFR BLD HPLC: 5.5 %
HCT VFR BLD AUTO: 38 %
HGB BLD-MCNC: 12.3 G/DL
LDH SERPL L TO P-CCNC: 162 U/L
LYMPHOCYTES # BLD AUTO: 2.5 K/UL
LYMPHOCYTES NFR BLD: 40 %
MCH RBC QN AUTO: 28.9 PG
MCHC RBC AUTO-ENTMCNC: 32.4 G/DL
MCV RBC AUTO: 89 FL
MONOCYTES # BLD AUTO: 0.5 K/UL
MONOCYTES NFR BLD: 7.7 %
NEUTROPHILS # BLD AUTO: 3 K/UL
NEUTROPHILS NFR BLD: 48.5 %
PLATELET # BLD AUTO: 367 K/UL
PMV BLD AUTO: 10.1 FL
POTASSIUM SERPL-SCNC: 4 MMOL/L
RBC # BLD AUTO: 4.26 M/UL
SODIUM SERPL-SCNC: 138 MMOL/L
WBC # BLD AUTO: 6.12 K/UL

## 2018-08-09 PROCEDURE — 86762 RUBELLA ANTIBODY: CPT

## 2018-08-09 PROCEDURE — 99999 PR PBB SHADOW E&M-EST. PATIENT-LVL III: CPT | Mod: PBBFAC,,,

## 2018-08-09 PROCEDURE — 85025 COMPLETE CBC W/AUTO DIFF WBC: CPT

## 2018-08-09 PROCEDURE — 86703 HIV-1/HIV-2 1 RESULT ANTBDY: CPT

## 2018-08-09 PROCEDURE — 36415 COLL VENOUS BLD VENIPUNCTURE: CPT

## 2018-08-09 PROCEDURE — 81025 URINE PREGNANCY TEST: CPT | Mod: S$GLB,,, | Performed by: NURSE PRACTITIONER

## 2018-08-09 PROCEDURE — 83036 HEMOGLOBIN GLYCOSYLATED A1C: CPT

## 2018-08-09 PROCEDURE — 86592 SYPHILIS TEST NON-TREP QUAL: CPT

## 2018-08-09 PROCEDURE — 87340 HEPATITIS B SURFACE AG IA: CPT

## 2018-08-09 PROCEDURE — 83021 HEMOGLOBIN CHROMOTOGRAPHY: CPT

## 2018-08-09 PROCEDURE — 87086 URINE CULTURE/COLONY COUNT: CPT

## 2018-08-09 PROCEDURE — 99214 OFFICE O/P EST MOD 30 MIN: CPT | Mod: 25,S$GLB,, | Performed by: NURSE PRACTITIONER

## 2018-08-09 PROCEDURE — 80048 BASIC METABOLIC PNL TOTAL CA: CPT

## 2018-08-09 PROCEDURE — 83615 LACTATE (LD) (LDH) ENZYME: CPT

## 2018-08-09 PROCEDURE — 87491 CHLMYD TRACH DNA AMP PROBE: CPT

## 2018-08-09 PROCEDURE — 86901 BLOOD TYPING SEROLOGIC RH(D): CPT

## 2018-08-09 NOTE — PROGRESS NOTES
"  CC: Positive Pregnancy Test    HISTORY OF PRESENT ILLNESS:    Lore Garcia is a 33 y.o. female, ,  Presents today for a routine exam complaining of amenorrhea and positive home urine pregnancy test.  Patient's last menstrual period was 2018.  Doing well. This is her second pregnancy. Prior fetal demise at 20 weeks and delivered vaginally in [place]. Pt states when she arrived to the hospital in pain she was already 6 cm. Pertinent medical hx of CHTN (has been on lisinopril but stopped with + UPT; has appt today to see her PCP regarding HTN meds), myomectomy in 2018, did a preconception consult with M 2/2 to these. Denies nausea. Reports breast tenderness. Denies pelvic pain. Light brown spotting a few days ago.     LMP: 18  EGA: 4w5d  EDC: 19    ROS:  GENERAL: No weight changes. No swelling. No fatigue. No fever.  CARDIOVASCULAR: No chest pain. No shortness of breath. No leg cramps.   NEUROLOGICAL: No headaches. No vision changes.  BREASTS: No pain. No lumps. No discharge.  ABDOMEN: No pain. No diarrhea. No constipation.  REPRODUCTIVE: No abnormal bleeding.   VULVA: No pain. No lesions. No itching.  VAGINA: No relaxation. No itching. No odor. No discharge. No lesions.  URINARY: No incontinence. No nocturia. No frequency. No dysuria.    MEDICATIONS AND ALLERGIES:  Reviewed    COMPREHENSIVE GYN HISTORY:  PAP History: Denies abnormal Pap- last pap in 2017 WNL  Infection History: Denies STDs. Denies PID.  Benign History: Denies uterine fibroids. Denies ovarian cysts. Denies endometriosis. Denies other conditions.  Cancer History: Denies cervical cancer. Denies uterine cancer or hyperplasia. Denies ovarian cancer. Denies vulvar cancer or pre-cancer. Denies vaginal cancer or pre-cancer. Denies breast cancer. Denies colon cancer.  Sexual Activity History: Reports currently being sexually active  Menstrual History: None.  Contraception: None    BP (!) 156/92   Ht 5' 5" (1.651 m)   Wt 114 kg " (251 lb 5.2 oz)   LMP 2018   BMI 41.82 kg/m²     PE:  AFFECT: Calm, alert and oriented X 3. Interactive during exam  GENERAL: Appears well-nourished, well-developed, in no acute distress.  HEAD: Normocephalic, atruamatic  TEETH: Good dentition.  THYROID: No thyromegally   BREASTS: No masses, skin changes, nipple discharge or adenopathy bilaterally.  SKIN: Normal for race, warm, & dry. No lesions or rashes.  LUNGS: Easy and unlabored, clear to auscultation bilaterally.  HEART: Regular rate and rhythm   ABDOMEN: Soft and nontender without masses or organomegally.  VULVA: No lesions, masses or tenderness.  VAGINA: Moist and well rugated without lesions or discharge.  CERVIX: Moist and pink without lesions, discharge or tenderness.      UTERUS SIZE: 6 week size, nontender and without masses.  ADNEXA: No masses or tenderness.  ESTIMATE OF PELVIC CAPACITY: Adequate  EXTREMITIES: No cyanosis, clubbing or edema. No calf tenderness.  LYMPH NODES: No axillary or inguinal adenopathy.    PROCEDURES:  UPT Positive  Genprobe  Pap current    ASSESSMENT/PLAN:  Amenorrhea  Positive urine pregnancy test (SAMUEL: 19, EGA: 4w5d based on LMP)    -  Routine prenatal care    Nausea and vomiting in pregnancy    -  Education regarding lifestyle and dietary modifications    -  Advised use of B6/Unisom. Pt will notify us if no relief/worsening symptoms, will consider Zofran if needed.    1st TRIMESTER COUNSELING: Discussed all, booklet provided:  Common complaints of pregnancy  HIV and other routine prenatal tests including  genetic screening  Risk factors identified by prenatal history  Oriented to practice - discussed anticipated course of prenatal care & indications for Ultrasound  Childbirth classes/Hospital facilities   Nutrition and weight gain counseling  Toxoplasmosis precautions (Cats/Raw Meat)  Sexual activity and exercise  Environmental/Work hazards  Travel  Tobacco (Ask, Advise, Assess, Assist, and Arrange), as  well as alcohol and drug use  Use of any medications (Including supplements, Vitamins, Herbs, or OTC Drugs)  Domestic violence  Seat belt use    FOLLOW-UP in 4 weeks with Dr. Ruiz-message sent to verify If she will take her as a new OB; pt aware  IOB labs today  Dating u/s in 3-4 weeks  Stop lisinopril; baseline Pre-e labs today; PCP to change BP meds  OTC prenatal  Needs early glucola  MFM consult    Lina Orta NP  OB/GYN

## 2018-08-10 LAB
BACTERIA UR CULT: NORMAL
BACTERIA UR CULT: NORMAL
C TRACH DNA SPEC QL NAA+PROBE: NOT DETECTED
HBV SURFACE AG SERPL QL IA: NEGATIVE
HGB A2 MFR BLD HPLC: 3.1 %
HGB FRACT BLD ELPH-IMP: NORMAL
HGB FRACT BLD ELPH-IMP: NORMAL
HIV 1+2 AB+HIV1 P24 AG SERPL QL IA: NEGATIVE
N GONORRHOEA DNA SPEC QL NAA+PROBE: NOT DETECTED
RPR SER QL: NORMAL
RUBV IGG SER-ACNC: 21.5 IU/ML
RUBV IGG SER-IMP: REACTIVE

## 2018-08-11 ENCOUNTER — PATIENT MESSAGE (OUTPATIENT)
Dept: OBSTETRICS AND GYNECOLOGY | Facility: CLINIC | Age: 33
End: 2018-08-11

## 2018-08-13 ENCOUNTER — TELEPHONE (OUTPATIENT)
Dept: OBSTETRICS AND GYNECOLOGY | Facility: CLINIC | Age: 33
End: 2018-08-13

## 2018-08-13 NOTE — TELEPHONE ENCOUNTER
Contacted the pt to inform her that Dr. Ruiz will see her for her pregnancy. Pt agreed to an appointment on 9/6 at 1PM. Pt verbalized understanding. Letter sent out.

## 2018-08-13 NOTE — TELEPHONE ENCOUNTER
----- Message from Elicia Ruiz MD sent at 8/11/2018  3:14 PM CDT -----  Regarding: RE: new OB  Yes I will take her  ----- Message -----  From: Elvira Kang MA  Sent: 8/9/2018   1:28 PM  To: Elicia Ruiz MD, Lina Orta NP  Subject: RE: new OB                                       I'll send Dr. Ruiz a message to inquire if she can see her.  Dr. Ruiz can you see this patient for her pregnancy?      ----- Message -----  From: Lina Orta NP  Sent: 8/9/2018  11:14 AM  To: Joseph Ashton  Subject: new OB                                           Hey- is joseph taking this new OB. She did her myomectomy recently.

## 2018-08-28 ENCOUNTER — PROCEDURE VISIT (OUTPATIENT)
Dept: OBSTETRICS AND GYNECOLOGY | Facility: CLINIC | Age: 33
End: 2018-08-28
Payer: COMMERCIAL

## 2018-08-28 DIAGNOSIS — N91.2 AMENORRHEA: ICD-10-CM

## 2018-08-28 DIAGNOSIS — O09.299 PRIOR PREGNANCY WITH FETAL DEMISE: ICD-10-CM

## 2018-08-28 DIAGNOSIS — Z32.01 POSITIVE PREGNANCY TEST: ICD-10-CM

## 2018-08-28 DIAGNOSIS — O36.80X0 ENCOUNTER TO DETERMINE FETAL VIABILITY OF PREGNANCY, SINGLE OR UNSPECIFIED FETUS: ICD-10-CM

## 2018-08-28 DIAGNOSIS — O10.919 CHRONIC HYPERTENSION AFFECTING PREGNANCY: ICD-10-CM

## 2018-08-28 DIAGNOSIS — Z36.89 ESTABLISH GESTATIONAL AGE, ULTRASOUND: ICD-10-CM

## 2018-08-28 PROCEDURE — 76801 OB US < 14 WKS SINGLE FETUS: CPT | Mod: S$GLB,,, | Performed by: OBSTETRICS & GYNECOLOGY

## 2018-08-28 NOTE — PROCEDURES
Obstetrical ultrasound completed today.  See report in imaging section of [facility].

## 2018-09-06 ENCOUNTER — INITIAL PRENATAL (OUTPATIENT)
Dept: OBSTETRICS AND GYNECOLOGY | Facility: CLINIC | Age: 33
End: 2018-09-06
Attending: OBSTETRICS & GYNECOLOGY
Payer: COMMERCIAL

## 2018-09-06 VITALS
WEIGHT: 256.38 LBS | DIASTOLIC BLOOD PRESSURE: 82 MMHG | BODY MASS INDEX: 42.67 KG/M2 | SYSTOLIC BLOOD PRESSURE: 126 MMHG

## 2018-09-06 DIAGNOSIS — E66.01 MORBID OBESITY WITH BMI OF 40.0-44.9, ADULT: ICD-10-CM

## 2018-09-06 DIAGNOSIS — Z34.01 PRIMIGRAVIDA IN FIRST TRIMESTER: Primary | ICD-10-CM

## 2018-09-06 DIAGNOSIS — Z98.890 HISTORY OF MYOMECTOMY: ICD-10-CM

## 2018-09-06 DIAGNOSIS — E03.9 HYPOTHYROIDISM, UNSPECIFIED TYPE: ICD-10-CM

## 2018-09-06 PROCEDURE — 99999 PR PBB SHADOW E&M-EST. PATIENT-LVL II: CPT | Mod: PBBFAC,,, | Performed by: OBSTETRICS & GYNECOLOGY

## 2018-09-06 PROCEDURE — 0502F SUBSEQUENT PRENATAL CARE: CPT | Mod: S$GLB,,, | Performed by: OBSTETRICS & GYNECOLOGY

## 2018-09-06 RX ORDER — AMLODIPINE BESYLATE 5 MG/1
TABLET ORAL
COMMUNITY
Start: 2018-09-04 | End: 2018-09-25 | Stop reason: SDUPTHER

## 2018-09-06 NOTE — PROGRESS NOTES
No problems.  Repeat BP today was 126/86,  Discussed hypertension in pregnancy - daily ASA after 1st trimester.  Discussed aneuploidy screening - desires contingency screen. Orders placed.  C/S at 37-39 weeks secondary to myomectomy.  Breastfeeding video reviewed - questions answered.  Offered Connected Moms - patient declined.  Bleeding/pain precautions.

## 2018-09-23 ENCOUNTER — PATIENT MESSAGE (OUTPATIENT)
Dept: OBSTETRICS AND GYNECOLOGY | Facility: CLINIC | Age: 33
End: 2018-09-23

## 2018-09-23 DIAGNOSIS — I10 CHRONIC HYPERTENSION: Primary | ICD-10-CM

## 2018-09-25 RX ORDER — AMLODIPINE BESYLATE 5 MG/1
5 TABLET ORAL DAILY
Qty: 30 TABLET | Refills: 11 | Status: SHIPPED | OUTPATIENT
Start: 2018-09-25 | End: 2019-09-16 | Stop reason: SDUPTHER

## 2018-09-27 ENCOUNTER — PATIENT MESSAGE (OUTPATIENT)
Dept: OBSTETRICS AND GYNECOLOGY | Facility: CLINIC | Age: 33
End: 2018-09-27

## 2018-10-02 ENCOUNTER — LAB VISIT (OUTPATIENT)
Dept: LAB | Facility: OTHER | Age: 33
End: 2018-10-02
Attending: OBSTETRICS & GYNECOLOGY
Payer: COMMERCIAL

## 2018-10-02 ENCOUNTER — ROUTINE PRENATAL (OUTPATIENT)
Dept: OBSTETRICS AND GYNECOLOGY | Facility: CLINIC | Age: 33
End: 2018-10-02
Payer: COMMERCIAL

## 2018-10-02 ENCOUNTER — PROCEDURE VISIT (OUTPATIENT)
Dept: MATERNAL FETAL MEDICINE | Facility: CLINIC | Age: 33
End: 2018-10-02
Attending: OBSTETRICS & GYNECOLOGY
Payer: COMMERCIAL

## 2018-10-02 VITALS
SYSTOLIC BLOOD PRESSURE: 120 MMHG | DIASTOLIC BLOOD PRESSURE: 98 MMHG | BODY MASS INDEX: 43.07 KG/M2 | WEIGHT: 258.81 LBS

## 2018-10-02 VITALS — BODY MASS INDEX: 42.96 KG/M2 | WEIGHT: 258.19 LBS

## 2018-10-02 DIAGNOSIS — E03.9 HYPOTHYROIDISM, UNSPECIFIED TYPE: ICD-10-CM

## 2018-10-02 DIAGNOSIS — Z36.82 ENCOUNTER FOR NUCHAL TRANSLUCENCY TESTING: Primary | ICD-10-CM

## 2018-10-02 DIAGNOSIS — Z36.89 ENCOUNTER FOR FETAL ANATOMIC SURVEY: ICD-10-CM

## 2018-10-02 DIAGNOSIS — Z34.01 PRIMIGRAVIDA IN FIRST TRIMESTER: ICD-10-CM

## 2018-10-02 DIAGNOSIS — Z3A.12 12 WEEKS GESTATION OF PREGNANCY: Primary | ICD-10-CM

## 2018-10-02 DIAGNOSIS — Z36.82 ENCOUNTER FOR NUCHAL TRANSLUCENCY TESTING: ICD-10-CM

## 2018-10-02 LAB
T4 FREE SERPL-MCNC: 1.03 NG/DL
TSH SERPL DL<=0.005 MIU/L-ACNC: 1.76 UIU/ML

## 2018-10-02 PROCEDURE — 76813 OB US NUCHAL MEAS 1 GEST: CPT | Mod: S$GLB,,, | Performed by: OBSTETRICS & GYNECOLOGY

## 2018-10-02 PROCEDURE — 84443 ASSAY THYROID STIM HORMONE: CPT

## 2018-10-02 PROCEDURE — 84439 ASSAY OF FREE THYROXINE: CPT

## 2018-10-02 PROCEDURE — 0500F INITIAL PRENATAL CARE VISIT: CPT | Mod: S$GLB,,, | Performed by: NURSE PRACTITIONER

## 2018-10-02 PROCEDURE — 99499 UNLISTED E&M SERVICE: CPT | Mod: S$GLB,,, | Performed by: OBSTETRICS & GYNECOLOGY

## 2018-10-02 PROCEDURE — 81508 FTL CGEN ABNOR TWO PROTEINS: CPT

## 2018-10-02 PROCEDURE — 99999 PR PBB SHADOW E&M-EST. PATIENT-LVL II: CPT | Mod: PBBFAC,,, | Performed by: NURSE PRACTITIONER

## 2018-10-02 NOTE — PROGRESS NOTES
Obstetrical ultrasound completed today.  See report in imaging section of [facility].

## 2018-10-02 NOTE — PROGRESS NOTES
Here for routine OB appt at 12w3d.  No complaints, denies VB and cramping. Just came from [facility] for NT scan.  Pain, bleeding, and ED precautions given.  F/U scheduled 4 weeks  SS part I today  CHAP info given  Flu shot today at flu fair

## 2018-10-05 LAB
# FETUSES US: NORMAL
AGE AT DELIVERY: 33
B-HCG MOM SERPL: NORMAL
B-HCG SERPL-ACNC: 84.6 IU/ML
FET CRL US.MEAS: 60.4 MM
FET NASAL BONE LENGTH US.MEAS: NORMAL MM
FET NUCHAL FOLD MOM THICKNESS US.MEAS: NORMAL
FET NUCHAL FOLD THICKNESS US.MEAS: 1.6 MM
FET TS 21 RISK FROM MAT AGE: NORMAL
GA (DAYS): 4 D
GA (WEEKS): 12 WK
IDDM PATIENT QL: NORMAL
INTEGRATED SCN PATIENT-IMP: NEGATIVE
PAPP-A MOM SERPL: NORMAL
PAPP-A SERPL-MCNC: NORMAL NG/ML
SEQUENTIAL SCREEN I INTERP.: NORMAL
SMOKING STATUS FTND: NO
TS 18 RISK FETUS: NORMAL
TS 21 RISK FETUS: NORMAL
US DATE: NORMAL

## 2018-10-08 ENCOUNTER — PATIENT MESSAGE (OUTPATIENT)
Dept: OBSTETRICS AND GYNECOLOGY | Facility: CLINIC | Age: 33
End: 2018-10-08

## 2018-11-01 ENCOUNTER — ROUTINE PRENATAL (OUTPATIENT)
Dept: OBSTETRICS AND GYNECOLOGY | Facility: CLINIC | Age: 33
End: 2018-11-01
Attending: OBSTETRICS & GYNECOLOGY
Payer: COMMERCIAL

## 2018-11-01 ENCOUNTER — LAB VISIT (OUTPATIENT)
Dept: LAB | Facility: OTHER | Age: 33
End: 2018-11-01
Attending: OBSTETRICS & GYNECOLOGY
Payer: COMMERCIAL

## 2018-11-01 VITALS
BODY MASS INDEX: 43.55 KG/M2 | DIASTOLIC BLOOD PRESSURE: 82 MMHG | WEIGHT: 261.69 LBS | SYSTOLIC BLOOD PRESSURE: 120 MMHG

## 2018-11-01 DIAGNOSIS — Z34.92 NORMAL PREGNANCY IN SECOND TRIMESTER: Primary | ICD-10-CM

## 2018-11-01 DIAGNOSIS — Z34.92 NORMAL PREGNANCY IN SECOND TRIMESTER: ICD-10-CM

## 2018-11-01 PROCEDURE — 0502F SUBSEQUENT PRENATAL CARE: CPT | Mod: S$GLB,,, | Performed by: OBSTETRICS & GYNECOLOGY

## 2018-11-01 PROCEDURE — 36415 COLL VENOUS BLD VENIPUNCTURE: CPT

## 2018-11-01 PROCEDURE — 99999 PR PBB SHADOW E&M-EST. PATIENT-LVL II: CPT | Mod: PBBFAC,,, | Performed by: OBSTETRICS & GYNECOLOGY

## 2018-11-01 PROCEDURE — 81511 FTL CGEN ABNOR FOUR ANAL: CPT

## 2018-11-01 NOTE — PROGRESS NOTES
No problem.  No vaginal bleeding, no loss of fluid, positive fetal movement, no contractions.  2nd part of SS today.  [facility] scheduled on 11/29/2018.  Bleeding/labor/rom/fmc precautions.

## 2018-11-05 ENCOUNTER — PATIENT MESSAGE (OUTPATIENT)
Dept: OBSTETRICS AND GYNECOLOGY | Facility: CLINIC | Age: 33
End: 2018-11-05

## 2018-11-05 LAB
# FETUSES US: NORMAL
AFP MOM SERPL: 0.96
AFP SERPL-MCNC: 27.1 NG/ML
AGE AT DELIVERY: 33
B-HCG MOM SERPL: 1.87
B-HCG SERPL-ACNC: 44.1 IU/ML
COLLECT DATE BLD: NORMAL
COLLECT DATE: NORMAL
FET NASAL BONE LENGTH US.MEAS: NORMAL MM
FET NUCHAL FOLD MOM THICKNESS US.MEAS: 1.39
FET NUCHAL FOLD THICKNESS US.MEAS: 1.6 MM
FET TS 21 RISK FROM MAT AGE: NORMAL
GA (DAYS): 4 D
GA (WEEKS): 16 WK
GA METHOD: NORMAL
GEST. AGE (DAYS) 2ND SAMPLE (SS2): 6
GEST. AGE (WKS) 1ST SAMPLE (SS2): 12
IDDM PATIENT QL: NORMAL
INHIBIN A MOM SERPL: 0.74
INHIBIN A SERPL-MCNC: 90.7 PG/ML
INTEGRATED SCN PATIENT-IMP: NEGATIVE
PAPP-A MOM SERPL: 1.87
PAPP-A SERPL-MCNC: NORMAL NG/ML
SEQUENTIAL SCREEN PART 2 INTERP: NORMAL
TS 18 RISK FETUS: NORMAL
TS 21 RISK FETUS: NORMAL
U ESTRIOL MOM SERPL: 1.18
U ESTRIOL SERPL-MCNC: 0.92 NG/ML

## 2018-11-29 ENCOUNTER — ROUTINE PRENATAL (OUTPATIENT)
Dept: OBSTETRICS AND GYNECOLOGY | Facility: CLINIC | Age: 33
End: 2018-11-29
Attending: OBSTETRICS & GYNECOLOGY
Payer: COMMERCIAL

## 2018-11-29 ENCOUNTER — PROCEDURE VISIT (OUTPATIENT)
Dept: MATERNAL FETAL MEDICINE | Facility: CLINIC | Age: 33
End: 2018-11-29
Attending: OBSTETRICS & GYNECOLOGY
Payer: COMMERCIAL

## 2018-11-29 VITALS
WEIGHT: 264.75 LBS | DIASTOLIC BLOOD PRESSURE: 82 MMHG | BODY MASS INDEX: 44.06 KG/M2 | SYSTOLIC BLOOD PRESSURE: 132 MMHG

## 2018-11-29 DIAGNOSIS — Z98.890 HISTORY OF MYOMECTOMY: ICD-10-CM

## 2018-11-29 DIAGNOSIS — E03.9 HYPOTHYROIDISM, UNSPECIFIED TYPE: ICD-10-CM

## 2018-11-29 DIAGNOSIS — O10.919 CHRONIC HYPERTENSION AFFECTING PREGNANCY: ICD-10-CM

## 2018-11-29 DIAGNOSIS — O09.299 PRIOR PREGNANCY WITH FETAL DEMISE: ICD-10-CM

## 2018-11-29 DIAGNOSIS — Z34.92 NORMAL PREGNANCY IN SECOND TRIMESTER: Primary | ICD-10-CM

## 2018-11-29 DIAGNOSIS — E66.01 MORBID OBESITY WITH BMI OF 40.0-44.9, ADULT: ICD-10-CM

## 2018-11-29 DIAGNOSIS — Z36.89 ENCOUNTER FOR FETAL ANATOMIC SURVEY: ICD-10-CM

## 2018-11-29 DIAGNOSIS — O99.210 MATERNAL OBESITY AFFECTING PREGNANCY, ANTEPARTUM: ICD-10-CM

## 2018-11-29 DIAGNOSIS — Z34.01 PRIMIGRAVIDA IN FIRST TRIMESTER: ICD-10-CM

## 2018-11-29 PROCEDURE — 99499 UNLISTED E&M SERVICE: CPT | Mod: S$GLB,,, | Performed by: OBSTETRICS & GYNECOLOGY

## 2018-11-29 PROCEDURE — 0502F SUBSEQUENT PRENATAL CARE: CPT | Mod: S$GLB,,, | Performed by: OBSTETRICS & GYNECOLOGY

## 2018-11-29 PROCEDURE — 76811 OB US DETAILED SNGL FETUS: CPT | Mod: S$GLB,,, | Performed by: OBSTETRICS & GYNECOLOGY

## 2018-11-29 PROCEDURE — 99999 PR PBB SHADOW E&M-EST. PATIENT-LVL II: CPT | Mod: PBBFAC,,, | Performed by: OBSTETRICS & GYNECOLOGY

## 2018-11-29 NOTE — PROGRESS NOTES
No problem.  No vaginal bleeding, no loss of fluid, positive fetal movement, no contractions.  DM Screen, CBC with next visit.  Has had flu shot.  Bleeding/labor/rom/fmc precautions.

## 2018-11-29 NOTE — PROGRESS NOTES
Obstetrical ultrasound completed today.  See report in imaging section of [facility].

## 2018-12-17 ENCOUNTER — TELEPHONE (OUTPATIENT)
Dept: OBSTETRICS AND GYNECOLOGY | Facility: CLINIC | Age: 33
End: 2018-12-17

## 2018-12-17 NOTE — TELEPHONE ENCOUNTER
Attempted to contact the patient to r/s appointment on 12/21 due to the provider being out of the office. No answer, left VM message for the patient to call the clinic back to get scheduled.

## 2018-12-20 ENCOUNTER — TELEPHONE (OUTPATIENT)
Dept: OBSTETRICS AND GYNECOLOGY | Facility: CLINIC | Age: 33
End: 2018-12-20

## 2018-12-20 NOTE — TELEPHONE ENCOUNTER
Returned the patient's call to the clinic. Patient returning call to r/s her appointment on 12/27 due to Dr. Ruiz being out of the office. Patient agreed to be scheduled with NORMA Orta in the [facility]. Patient informed that the schedule for the [facility] hasn't been released and that it should be on Monday 12/24. Patient verbalized understanding. Patient also scheduled for US on 12/27. Orders need to be placed for glucose testing so that the patient can complete the lab. Patient verbalized understanding.

## 2018-12-20 NOTE — TELEPHONE ENCOUNTER
----- Message from Mukund De La Cruz sent at 12/20/2018 11:58 AM CST -----  Contact: ELIZABETH HILARIO [[id number]]            Name of Who is Calling: ELIZABETH HILARIO [[id number]]      What is the request in detail: Returning a call in regards to rescheduling routine ob but also wants to know about a test that was suppose to be done. Please advise    Can the clinic reply by [facility]: no      [facility]: [phone]

## 2018-12-27 ENCOUNTER — PROCEDURE VISIT (OUTPATIENT)
Dept: MATERNAL FETAL MEDICINE | Facility: CLINIC | Age: 33
End: 2018-12-27
Payer: COMMERCIAL

## 2018-12-27 ENCOUNTER — TELEPHONE (OUTPATIENT)
Dept: OBSTETRICS AND GYNECOLOGY | Facility: CLINIC | Age: 33
End: 2018-12-27

## 2018-12-27 ENCOUNTER — ROUTINE PRENATAL (OUTPATIENT)
Dept: OBSTETRICS AND GYNECOLOGY | Facility: CLINIC | Age: 33
End: 2018-12-27
Payer: COMMERCIAL

## 2018-12-27 ENCOUNTER — LAB VISIT (OUTPATIENT)
Dept: LAB | Facility: OTHER | Age: 33
End: 2018-12-27
Attending: OBSTETRICS & GYNECOLOGY
Payer: COMMERCIAL

## 2018-12-27 VITALS
SYSTOLIC BLOOD PRESSURE: 136 MMHG | WEIGHT: 266.56 LBS | DIASTOLIC BLOOD PRESSURE: 90 MMHG | BODY MASS INDEX: 44.35 KG/M2

## 2018-12-27 DIAGNOSIS — O10.919 CHRONIC HYPERTENSION AFFECTING PREGNANCY: ICD-10-CM

## 2018-12-27 DIAGNOSIS — Z36.89 ENCOUNTER FOR ULTRASOUND TO CHECK FETAL GROWTH: Primary | ICD-10-CM

## 2018-12-27 DIAGNOSIS — Z34.92 NORMAL PREGNANCY IN SECOND TRIMESTER: ICD-10-CM

## 2018-12-27 DIAGNOSIS — Z3A.24 24 WEEKS GESTATION OF PREGNANCY: Primary | ICD-10-CM

## 2018-12-27 LAB — GLUCOSE SERPL-MCNC: 193 MG/DL

## 2018-12-27 PROCEDURE — 0502F SUBSEQUENT PRENATAL CARE: CPT | Mod: S$GLB,,, | Performed by: OBSTETRICS & GYNECOLOGY

## 2018-12-27 PROCEDURE — 99499 UNLISTED E&M SERVICE: CPT | Mod: S$GLB,,, | Performed by: OBSTETRICS & GYNECOLOGY

## 2018-12-27 PROCEDURE — 82950 GLUCOSE TEST: CPT

## 2018-12-27 PROCEDURE — 99999 PR PBB SHADOW E&M-EST. PATIENT-LVL II: CPT | Mod: PBBFAC,,,

## 2018-12-27 PROCEDURE — 76816 OB US FOLLOW-UP PER FETUS: CPT | Mod: S$GLB,,, | Performed by: OBSTETRICS & GYNECOLOGY

## 2018-12-27 NOTE — PROGRESS NOTES
Here for routine OB appt at 24w5d, with no complaints. Just came from [facility]. Denies VB and cramping. Took Norvasc this morning. Feels good. Denies headaches, vision changes, or RUQ pain. In process of doing glucola. Lives in [place], so likes to cluster appointments together if possible. Pain, bleeding, and PTL precautions given.  F/U scheduled 4 weeks  GTT pending  Tdap next

## 2018-12-27 NOTE — TELEPHONE ENCOUNTER
----- Message from Sheryl Hogue MA sent at 12/27/2018 12:23 PM CST -----  Regarding: Appointment  Please schedule this pt an appt with TDAP in four weeks with Dr. Ruiz. Lina saw her today in [facility], thank you.

## 2018-12-28 ENCOUNTER — TELEPHONE (OUTPATIENT)
Dept: OBSTETRICS AND GYNECOLOGY | Facility: CLINIC | Age: 33
End: 2018-12-28

## 2018-12-28 ENCOUNTER — PATIENT MESSAGE (OUTPATIENT)
Dept: OBSTETRICS AND GYNECOLOGY | Facility: CLINIC | Age: 33
End: 2018-12-28

## 2018-12-28 DIAGNOSIS — O24.419 GESTATIONAL DIABETES MELLITUS (GDM) IN SECOND TRIMESTER, GESTATIONAL DIABETES METHOD OF CONTROL UNSPECIFIED: Primary | ICD-10-CM

## 2019-01-24 ENCOUNTER — LAB VISIT (OUTPATIENT)
Dept: LAB | Facility: OTHER | Age: 34
End: 2019-01-24
Attending: OBSTETRICS & GYNECOLOGY
Payer: COMMERCIAL

## 2019-01-24 ENCOUNTER — PROCEDURE VISIT (OUTPATIENT)
Dept: MATERNAL FETAL MEDICINE | Facility: CLINIC | Age: 34
End: 2019-01-24
Attending: OBSTETRICS & GYNECOLOGY
Payer: COMMERCIAL

## 2019-01-24 DIAGNOSIS — O10.919 CHRONIC HYPERTENSION AFFECTING PREGNANCY: ICD-10-CM

## 2019-01-24 DIAGNOSIS — O99.210 MATERNAL OBESITY AFFECTING PREGNANCY, ANTEPARTUM: ICD-10-CM

## 2019-01-24 DIAGNOSIS — Z36.89 ENCOUNTER FOR ULTRASOUND TO CHECK FETAL GROWTH: ICD-10-CM

## 2019-01-24 DIAGNOSIS — O24.419 GESTATIONAL DIABETES MELLITUS (GDM) IN SECOND TRIMESTER, GESTATIONAL DIABETES METHOD OF CONTROL UNSPECIFIED: ICD-10-CM

## 2019-01-24 LAB
GLUCOSE SERPL-MCNC: 119 MG/DL
GLUCOSE SERPL-MCNC: 125 MG/DL
GLUCOSE SERPL-MCNC: 212 MG/DL
GLUCOSE SERPL-MCNC: 248 MG/DL

## 2019-01-24 PROCEDURE — 99499 NO LOS: ICD-10-PCS | Mod: S$GLB,,, | Performed by: OBSTETRICS & GYNECOLOGY

## 2019-01-24 PROCEDURE — 36415 COLL VENOUS BLD VENIPUNCTURE: CPT

## 2019-01-24 PROCEDURE — 76816 PR  US,PREGNANT UTERUS,F/U,TRANSABD APP: ICD-10-PCS | Mod: S$GLB,,, | Performed by: OBSTETRICS & GYNECOLOGY

## 2019-01-24 PROCEDURE — 82952 GTT-ADDED SAMPLES: CPT

## 2019-01-24 PROCEDURE — 82951 GLUCOSE TOLERANCE TEST (GTT): CPT

## 2019-01-24 PROCEDURE — 76816 OB US FOLLOW-UP PER FETUS: CPT | Mod: S$GLB,,, | Performed by: OBSTETRICS & GYNECOLOGY

## 2019-01-24 PROCEDURE — 99499 UNLISTED E&M SERVICE: CPT | Mod: S$GLB,,, | Performed by: OBSTETRICS & GYNECOLOGY

## 2019-01-25 ENCOUNTER — TELEPHONE (OUTPATIENT)
Dept: OBSTETRICS AND GYNECOLOGY | Facility: CLINIC | Age: 34
End: 2019-01-25

## 2019-01-25 ENCOUNTER — PATIENT MESSAGE (OUTPATIENT)
Dept: OBSTETRICS AND GYNECOLOGY | Facility: CLINIC | Age: 34
End: 2019-01-25

## 2019-01-25 DIAGNOSIS — O24.410 DIET CONTROLLED GESTATIONAL DIABETES MELLITUS (GDM) IN THIRD TRIMESTER: ICD-10-CM

## 2019-01-25 NOTE — TELEPHONE ENCOUNTER
Returned the patient's call to the clinic. Patient had a meeting that she needs to attend on the first. Patient agreed to reschedule her routine ob appointment to an appointment with NP Lina Orta [facility] on 1/31 at 9:20AM. The patient's injection will be rescheduled to 10AM on 1/31 with same day access. Patient verbalized understanding.

## 2019-01-25 NOTE — TELEPHONE ENCOUNTER
----- Message from Matthew Peacock sent at 1/25/2019 10:02 AM CST -----  PLEASE CALL OB PT SHE HAS A APPT ON 02/01 SHE NEEDS TO RESCHEDULE THE NEXT AVAILABLE THAT IS COMING UP FOR ME IS 02/21 [phone]

## 2019-01-25 NOTE — TELEPHONE ENCOUNTER
Contacted the patient to schedule her diabetes education class. Patient agreed to an appointment on 2/7 at 10AM. Patient informed of the appointment location, date, and time. Patient verbalized understanding.

## 2019-01-28 ENCOUNTER — PATIENT MESSAGE (OUTPATIENT)
Dept: OBSTETRICS AND GYNECOLOGY | Facility: CLINIC | Age: 34
End: 2019-01-28

## 2019-01-28 DIAGNOSIS — O24.415 GESTATIONAL DIABETES MELLITUS (GDM) IN THIRD TRIMESTER CONTROLLED ON ORAL HYPOGLYCEMIC DRUG: Primary | ICD-10-CM

## 2019-01-29 ENCOUNTER — PATIENT MESSAGE (OUTPATIENT)
Dept: OBSTETRICS AND GYNECOLOGY | Facility: CLINIC | Age: 34
End: 2019-01-29

## 2019-01-29 RX ORDER — METFORMIN HYDROCHLORIDE 500 MG/1
500 TABLET ORAL
Qty: 30 TABLET | Refills: 11 | Status: SHIPPED | OUTPATIENT
Start: 2019-01-29

## 2019-01-31 ENCOUNTER — CLINICAL SUPPORT (OUTPATIENT)
Dept: OBSTETRICS AND GYNECOLOGY | Facility: CLINIC | Age: 34
End: 2019-01-31
Payer: COMMERCIAL

## 2019-01-31 ENCOUNTER — ROUTINE PRENATAL (OUTPATIENT)
Dept: OBSTETRICS AND GYNECOLOGY | Facility: CLINIC | Age: 34
End: 2019-01-31
Attending: OBSTETRICS & GYNECOLOGY
Payer: COMMERCIAL

## 2019-01-31 ENCOUNTER — TELEPHONE (OUTPATIENT)
Dept: OBSTETRICS AND GYNECOLOGY | Facility: CLINIC | Age: 34
End: 2019-01-31

## 2019-01-31 VITALS — WEIGHT: 263.25 LBS | DIASTOLIC BLOOD PRESSURE: 80 MMHG | SYSTOLIC BLOOD PRESSURE: 132 MMHG | BODY MASS INDEX: 43.8 KG/M2

## 2019-01-31 DIAGNOSIS — E66.01 MORBID OBESITY: ICD-10-CM

## 2019-01-31 DIAGNOSIS — O09.299 PRIOR PREGNANCY WITH FETAL DEMISE: ICD-10-CM

## 2019-01-31 DIAGNOSIS — Z3A.29 29 WEEKS GESTATION OF PREGNANCY: Primary | ICD-10-CM

## 2019-01-31 PROCEDURE — 99999 PR PBB SHADOW E&M-EST. PATIENT-LVL II: ICD-10-PCS | Mod: PBBFAC,,,

## 2019-01-31 PROCEDURE — 99999 PR PBB SHADOW E&M-EST. PATIENT-LVL II: CPT | Mod: PBBFAC,,,

## 2019-01-31 PROCEDURE — 0502F PR SUBSEQUENT PRENATAL CARE: ICD-10-PCS | Mod: S$GLB,,, | Performed by: OBSTETRICS & GYNECOLOGY

## 2019-01-31 PROCEDURE — 90715 TDAP VACCINE GREATER THAN OR EQUAL TO 7YO IM: ICD-10-PCS | Mod: S$GLB,,, | Performed by: NURSE PRACTITIONER

## 2019-01-31 PROCEDURE — 90715 TDAP VACCINE 7 YRS/> IM: CPT | Mod: S$GLB,,, | Performed by: NURSE PRACTITIONER

## 2019-01-31 PROCEDURE — 90471 TDAP VACCINE GREATER THAN OR EQUAL TO 7YO IM: ICD-10-PCS | Mod: S$GLB,,, | Performed by: NURSE PRACTITIONER

## 2019-01-31 PROCEDURE — 0502F SUBSEQUENT PRENATAL CARE: CPT | Mod: S$GLB,,, | Performed by: OBSTETRICS & GYNECOLOGY

## 2019-01-31 PROCEDURE — 90471 IMMUNIZATION ADMIN: CPT | Mod: S$GLB,,, | Performed by: NURSE PRACTITIONER

## 2019-01-31 NOTE — PROGRESS NOTES
"OB at 29w5d. Feels a little overwhelmed and hopes she is doing everything for a successful pregnancy. Failed her 3 hour GTT and is now on Metformin. Has an appt next week with the diabetes educator. We discussed Tdap and upcoming growth scan. Will need prenatal testing weekly starting at 32 weeks. Denies any headaches, chest pain, SOB, or vision changes. Still taking Norvasc 5 mg PO daily. Occ feels like her "heart is beating hard". Denies palpitations, SOB, or chest pain. Will monitor and discussed possibility of 24 hour holter monitor if no improvement or new s/s.  Reports good FM.  Denies LOF, denies VB, denies contractions.  Reviewed warning signs of Labor and Preeclampsia.  Daily FM counts reinforced.  F/U scheduled 2 weeks  Growth scan on 2/21    "

## 2019-01-31 NOTE — TELEPHONE ENCOUNTER
Contacted the patient to schedule the rest of her routine ob appointments through March. Patient also scheduled for prenatal testing per [place] requested by NORMA Orta. Patient informed of her appointment dates and times. Patient verbalized understanding.

## 2019-02-04 ENCOUNTER — PATIENT MESSAGE (OUTPATIENT)
Dept: OBSTETRICS AND GYNECOLOGY | Facility: CLINIC | Age: 34
End: 2019-02-04

## 2019-02-07 ENCOUNTER — HOSPITAL ENCOUNTER (OUTPATIENT)
Dept: DIABETES | Facility: OTHER | Age: 34
Discharge: HOME | End: 2019-02-07
Attending: OBSTETRICS & GYNECOLOGY
Payer: COMMERCIAL

## 2019-02-07 VITALS — BODY MASS INDEX: 44.39 KG/M2 | WEIGHT: 266.75 LBS

## 2019-02-07 DIAGNOSIS — E66.01 MORBID OBESITY WITH BMI OF 40.0-44.9, ADULT: ICD-10-CM

## 2019-02-07 DIAGNOSIS — O24.410 DIET CONTROLLED GESTATIONAL DIABETES MELLITUS (GDM) IN THIRD TRIMESTER: Primary | ICD-10-CM

## 2019-02-07 PROCEDURE — G0108 DIAB MANAGE TRN  PER INDIV: HCPCS

## 2019-02-08 NOTE — PROGRESS NOTES
Diabetes Education  Author: Madhuri Meyers RN  Date: 2/8/2019    Diabetes Care Management Summary  Diabetes Education Record Assessment/Progress: Initial  Current Diabetes Risk Level: Low         Diabetes Type  Diabetes Type : Gestational    Diabetes History  Diabetes Diagnosis: 0-1 year  Current Treatment: Oral Medication  Reviewed Problem List with Patient: Yes    Health Maintenance was reviewed today with patient. Discussed with patient importance of routine eye exams, foot exams/foot care, blood work (i.e.: A1c, microalbumin, and lipid), dental visits, yearly flu vaccine, and pneumonia vaccine as indicated by PCP. Patient verbalized understanding.     Health Maintenance Topics with due status: Not Due       Topic Last Completion Date    TETANUS VACCINE 01/31/2019     Health Maintenance Due   Topic Date Due    Lipid Panel  1985    Pap Smear with HPV Cotest  05/04/2006       Nutrition  Meal Plan 24 Hour Recall - Breakfast: orange, water   Meal Plan 24 Hour Recall - Lunch: 2 slices pizza (delux veggie) - water   Meal Plan 24 Hour Recall - Dinner: hamburger, bowl of peas (1 can) - lemonade   Meal Plan 24 Hour Recall - Snack: didn't really snack     Monitoring   Self Monitoring : SMBG done today   Blood Glucose Logs: No  Do you use a personal continuous glucose monitor?: No    Exercise   Exercise Type: (steps per day: avg of 5-6,000 )  Frequency: Never    Current Diabetes Treatment   Current Treatment: Oral Medication    Social History  Preferred Learning Method: Reading Materials, Video  Primary Support: Other(partner)  Educational Level: College Graduate  Occupation:    Smoking Status: Never a Smoker  Alcohol Use: Never(not while pregnant)                                Barriers to Change  Barriers to Change: None  Learning Challenges : None    Readiness to Learn   Readiness to Learn : Eager    Cultural Influences  Cultural Influences: No    Diabetes Education Assessment/Progress  Diabetes  Disease Process (diabetes disease process and treatment options): Discussion, No Knowledge, Individual Session, Written Materials Provided(Ed on what GDM is and how it evolves throughout pregnancy, ed on importance of using diet, exercise and lifestyle changes to control BG during pregnancy)  Nutrition (Incorporating nutritional management into one's lifestyle): Discussion, Individual Session, No Knowledge, Written Materials Provided, Demonstration(Ed on the plate method and CHO restriction/portioning - ed on choosing healthy CHO, ed on increasing veggies, choosing lean protein/healthy fats and eliminating sweet drinks - label reading exercise completed)  Physical Activity (incorporating physical activity into one's lifestyle): Discussion, Individual Session, No Knowledge, Written Materials Provided(Ed on ADA recs for physical activity and safety considerations during pregnancy)  Medications (states correct name, dose, onset, peak, duration, side effects & timing of meds): Discussion, No Knowledge, Individual Session, Written Materials Provided(pt already taking metformin, tolerating well so far - Ed on additonal meds used to control BG during pregnancy)  Monitoring (monitoring blood glucose/other parameters & using results): Discussion, Individual Session, No Knowledge, Written Materials Provided, Demonstration, Return Demonstration(Ed on SMBG schedule, ed on BG goals and how to use glucometer - ed on trending results and when to call the DrAna Laura w/ elevating trends)  Acute Complications (preventing, detecting, and treating acute complications): Discussion, Individual Session, No Knowledge, Written Materials Provided(Ed on s/s of low BG and how to prevent and treat during pregnancy - ed on GDM related complications and when to seek medical attention)  Chronic Complications (preventing, detecting, and treating chronic complications): Discussion, Individual Session, No Knowledge, Written Materials Provided(Ed on long  term complications r/t GDM, especially increased risk of developing Type II DM later on in life, stressed importance of getting screened annually w/ PCP)  Clinical (diabetes, other pertinent medical history, and relevant comorbidities reviewed during visit): Discussion, Individual Session, No Knowledge, Written Materials Provided(ed on importance of weight management throughout pregnancy and post partum to reduce risk of progression of DM)  Cognitive (knowledge of self-management skills, functional health literacy): Discussion, Individual Session, No Knowledge, Written Materials Provided  Psychosocial (emotional response to diabetes): Discussion, Individual Session, No Knowledge, Written Materials Provided  Diabetes Distress and Support Systems: Discussion, Individual Session, No Knowledge, Written Materials Provided  Behavioral (readiness for change, lifestyle practices, self-care behaviors): Discussion, Individual Session, No Knowledge, Written Materials Provided(pt is very motivated to make changes to have healthy pregnancy)    Goals  Patient has selected/evaluated goals during today's session: Yes, selected  Healthy Eating: Set(pt will eliminate sweet drinks from diet and restrict cho's per meal to 30 gram)  Start Date: 02/07/19  Monitoring: Set(pt will SMBG 4x/day and bring logs to all future appointments)  Start Date: 02/07/19         Diabetes Care Plan/Intervention  Education Plan/Intervention: Individual Follow-Up DSMT    Diabetes Meal Plan  Restrictions: Restricted Carbohydrate  Carbohydrate Per Meal: 30-45g    Today's Self-Management Care Plan was developed with the patient's input and is based on barriers identified during today's assessment.    The long and short-term goals in the care plan were written with the patient/caregiver's input. The patient has agreed to work toward these goals to improve her overall diabetes control.      The patient received a copy of today's self-management plan and  verbalized understanding of the care plan, goals, and all of today's instructions.      The patient was encouraged to communicate with her physician and care team regarding her condition(s) and treatment.  I provided the patient with my contact information today and encouraged her to contact me via phone or patient portal as needed.     Education Units of Time   Time Spent: 60 min

## 2019-02-14 ENCOUNTER — ROUTINE PRENATAL (OUTPATIENT)
Dept: OBSTETRICS AND GYNECOLOGY | Facility: CLINIC | Age: 34
End: 2019-02-14
Attending: OBSTETRICS & GYNECOLOGY
Payer: COMMERCIAL

## 2019-02-14 VITALS — DIASTOLIC BLOOD PRESSURE: 86 MMHG | WEIGHT: 267 LBS | SYSTOLIC BLOOD PRESSURE: 136 MMHG | BODY MASS INDEX: 44.43 KG/M2

## 2019-02-14 DIAGNOSIS — O09.299 PRIOR PREGNANCY WITH FETAL DEMISE: ICD-10-CM

## 2019-02-14 DIAGNOSIS — O10.919 CHRONIC HYPERTENSION AFFECTING PREGNANCY: ICD-10-CM

## 2019-02-14 DIAGNOSIS — Z98.890 HISTORY OF MYOMECTOMY: ICD-10-CM

## 2019-02-14 DIAGNOSIS — O24.410 DIET CONTROLLED GESTATIONAL DIABETES MELLITUS (GDM) IN THIRD TRIMESTER: Primary | ICD-10-CM

## 2019-02-14 DIAGNOSIS — E66.01 MORBID OBESITY WITH BMI OF 40.0-44.9, ADULT: ICD-10-CM

## 2019-02-14 DIAGNOSIS — E03.9 HYPOTHYROIDISM, UNSPECIFIED TYPE: ICD-10-CM

## 2019-02-14 PROCEDURE — 99999 PR PBB SHADOW E&M-EST. PATIENT-LVL II: CPT | Mod: PBBFAC,,, | Performed by: OBSTETRICS & GYNECOLOGY

## 2019-02-14 PROCEDURE — 0502F PR SUBSEQUENT PRENATAL CARE: ICD-10-PCS | Mod: CPTII,S$GLB,, | Performed by: OBSTETRICS & GYNECOLOGY

## 2019-02-14 PROCEDURE — 99999 PR PBB SHADOW E&M-EST. PATIENT-LVL II: ICD-10-PCS | Mod: PBBFAC,,, | Performed by: OBSTETRICS & GYNECOLOGY

## 2019-02-14 PROCEDURE — 0502F SUBSEQUENT PRENATAL CARE: CPT | Mod: CPTII,S$GLB,, | Performed by: OBSTETRICS & GYNECOLOGY

## 2019-02-14 RX ORDER — LANCETS
1 EACH MISCELLANEOUS 4 TIMES DAILY
Qty: 200 EACH | Refills: 6 | Status: SHIPPED | OUTPATIENT
Start: 2019-02-14

## 2019-02-14 NOTE — PROGRESS NOTES
No problem.  No headaches, no blurry vision.  No vaginal bleeding, no loss of fluid, positive fetal movement, no contractions.  3 hour GTT c/w GDM.  She was given supplies but needs more lancets and needles.  PNT to start next week.  DAYA sono on 2/21.  Will breastfeed, discussed pediatrician and prenatal classes, natural family planning for contraception.  Bleeding/labor/rom/fmc/pih precautions.

## 2019-02-19 ENCOUNTER — PATIENT MESSAGE (OUTPATIENT)
Dept: OBSTETRICS AND GYNECOLOGY | Facility: CLINIC | Age: 34
End: 2019-02-19

## 2019-02-21 ENCOUNTER — PROCEDURE VISIT (OUTPATIENT)
Dept: MATERNAL FETAL MEDICINE | Facility: CLINIC | Age: 34
End: 2019-02-21
Attending: OBSTETRICS & GYNECOLOGY
Payer: COMMERCIAL

## 2019-02-21 ENCOUNTER — HOSPITAL ENCOUNTER (OUTPATIENT)
Dept: PERINATAL CARE | Facility: OTHER | Age: 34
Discharge: HOME | End: 2019-02-21
Attending: OBSTETRICS & GYNECOLOGY
Payer: COMMERCIAL

## 2019-02-21 ENCOUNTER — OFFICE VISIT (OUTPATIENT)
Dept: MATERNAL FETAL MEDICINE | Facility: CLINIC | Age: 34
End: 2019-02-21
Payer: COMMERCIAL

## 2019-02-21 ENCOUNTER — HOSPITAL ENCOUNTER (EMERGENCY)
Facility: OTHER | Age: 34
Discharge: HOME | End: 2019-02-21
Attending: OBSTETRICS & GYNECOLOGY
Payer: COMMERCIAL

## 2019-02-21 VITALS
HEART RATE: 106 BPM | OXYGEN SATURATION: 100 % | RESPIRATION RATE: 17 BRPM | SYSTOLIC BLOOD PRESSURE: 133 MMHG | TEMPERATURE: 98 F | DIASTOLIC BLOOD PRESSURE: 82 MMHG

## 2019-02-21 DIAGNOSIS — Z3A.29 29 WEEKS GESTATION OF PREGNANCY: ICD-10-CM

## 2019-02-21 DIAGNOSIS — O09.299 PRIOR PREGNANCY WITH FETAL DEMISE: ICD-10-CM

## 2019-02-21 DIAGNOSIS — O36.63X1 EXCESSIVE FETAL GROWTH AFFECTING MANAGEMENT OF PREGNANCY IN THIRD TRIMESTER, FETUS 1 OF MULTIPLE GESTATION: ICD-10-CM

## 2019-02-21 DIAGNOSIS — O36.8390 FETAL TACHYCARDIA AFFECTING MANAGEMENT OF MOTHER: ICD-10-CM

## 2019-02-21 DIAGNOSIS — O99.210 MATERNAL OBESITY AFFECTING PREGNANCY, ANTEPARTUM: ICD-10-CM

## 2019-02-21 DIAGNOSIS — E66.01 MORBID OBESITY: ICD-10-CM

## 2019-02-21 DIAGNOSIS — Z3A.32 32 WEEKS GESTATION OF PREGNANCY: ICD-10-CM

## 2019-02-21 DIAGNOSIS — O36.63X1 EXCESSIVE FETAL GROWTH AFFECTING MANAGEMENT OF PREGNANCY IN THIRD TRIMESTER, FETUS 1 OF MULTIPLE GESTATION: Primary | ICD-10-CM

## 2019-02-21 DIAGNOSIS — I10 CHRONIC HYPERTENSION: Primary | ICD-10-CM

## 2019-02-21 DIAGNOSIS — O10.919 CHRONIC HYPERTENSION AFFECTING PREGNANCY: ICD-10-CM

## 2019-02-21 DIAGNOSIS — O10.919 CHRONIC HYPERTENSION AFFECTING PREGNANCY: Primary | ICD-10-CM

## 2019-02-21 DIAGNOSIS — O24.410 DIET CONTROLLED GESTATIONAL DIABETES MELLITUS (GDM) IN THIRD TRIMESTER: ICD-10-CM

## 2019-02-21 DIAGNOSIS — O36.8390 FETAL TACHYCARDIA AFFECTING MANAGEMENT OF MOTHER: Primary | ICD-10-CM

## 2019-02-21 DIAGNOSIS — Z36.89 ENCOUNTER FOR ULTRASOUND TO CHECK FETAL GROWTH: ICD-10-CM

## 2019-02-21 LAB
ALBUMIN SERPL BCP-MCNC: 3 G/DL
ALP SERPL-CCNC: 89 U/L
ALT SERPL W/O P-5'-P-CCNC: 24 U/L
ANION GAP SERPL CALC-SCNC: 12 MMOL/L
AST SERPL-CCNC: 28 U/L
BASOPHILS # BLD AUTO: 0.01 K/UL
BASOPHILS NFR BLD: 0.1 %
BILIRUB SERPL-MCNC: 0.3 MG/DL
BUN SERPL-MCNC: 4 MG/DL
CALCIUM SERPL-MCNC: 9.4 MG/DL
CHLORIDE SERPL-SCNC: 107 MMOL/L
CO2 SERPL-SCNC: 18 MMOL/L
CREAT SERPL-MCNC: 0.6 MG/DL
CREAT UR-MCNC: 69.7 MG/DL
DIFFERENTIAL METHOD: NORMAL
EOSINOPHIL # BLD AUTO: 0.2 K/UL
EOSINOPHIL NFR BLD: 2.5 %
ERYTHROCYTE [DISTWIDTH] IN BLOOD BY AUTOMATED COUNT: 14 %
EST. GFR  (AFRICAN AMERICAN): >60 ML/MIN/1.73 M^2
EST. GFR  (NON AFRICAN AMERICAN): >60 ML/MIN/1.73 M^2
GLUCOSE SERPL-MCNC: 91 MG/DL
HCT VFR BLD AUTO: 37.8 %
HGB BLD-MCNC: 12.7 G/DL
LYMPHOCYTES # BLD AUTO: 2.2 K/UL
LYMPHOCYTES NFR BLD: 31.6 %
MCH RBC QN AUTO: 29.1 PG
MCHC RBC AUTO-ENTMCNC: 33.6 G/DL
MCV RBC AUTO: 87 FL
MONOCYTES # BLD AUTO: 0.8 K/UL
MONOCYTES NFR BLD: 11.1 %
NEUTROPHILS # BLD AUTO: 3.7 K/UL
NEUTROPHILS NFR BLD: 54 %
PLATELET # BLD AUTO: 324 K/UL
PMV BLD AUTO: 9.7 FL
POCT GLUCOSE: 96 MG/DL (ref 70–110)
POTASSIUM SERPL-SCNC: 4.6 MMOL/L
PROT SERPL-MCNC: 7.5 G/DL
PROT UR-MCNC: 9 MG/DL
PROT/CREAT UR: 0.13 MG/G{CREAT}
RBC # BLD AUTO: 4.37 M/UL
SODIUM SERPL-SCNC: 137 MMOL/L
WBC # BLD AUTO: 6.93 K/UL

## 2019-02-21 PROCEDURE — 76820 PR US, OB DOPPLER, FETAL UMBILICAL ARTERY ECHO: ICD-10-PCS | Mod: S$GLB,,, | Performed by: PEDIATRICS

## 2019-02-21 PROCEDURE — 80053 COMPREHEN METABOLIC PANEL: CPT

## 2019-02-21 PROCEDURE — 59025 PR FETAL 2N-STRESS TEST: ICD-10-PCS | Mod: 26,,, | Performed by: OBSTETRICS & GYNECOLOGY

## 2019-02-21 PROCEDURE — 99213 OFFICE O/P EST LOW 20 MIN: CPT | Mod: 25,S$GLB,, | Performed by: PEDIATRICS

## 2019-02-21 PROCEDURE — 59025 FETAL NON-STRESS TEST: CPT

## 2019-02-21 PROCEDURE — 76819 FETAL BIOPHYS PROFIL W/O NST: CPT | Mod: S$GLB,,, | Performed by: PEDIATRICS

## 2019-02-21 PROCEDURE — 76816 PR  US,PREGNANT UTERUS,F/U,TRANSABD APP: ICD-10-PCS | Mod: S$GLB,,, | Performed by: PEDIATRICS

## 2019-02-21 PROCEDURE — 99499 UNLISTED E&M SERVICE: CPT | Mod: S$GLB,,, | Performed by: PEDIATRICS

## 2019-02-21 PROCEDURE — 59025 FETAL NON-STRESS TEST: CPT | Mod: 26,,, | Performed by: OBSTETRICS & GYNECOLOGY

## 2019-02-21 PROCEDURE — 76820 UMBILICAL ARTERY ECHO: CPT | Mod: S$GLB,,, | Performed by: PEDIATRICS

## 2019-02-21 PROCEDURE — 59025 FETAL NON-STRESS TEST: CPT | Mod: 76

## 2019-02-21 PROCEDURE — 99284 EMERGENCY DEPT VISIT MOD MDM: CPT | Mod: 25,,, | Performed by: OBSTETRICS & GYNECOLOGY

## 2019-02-21 PROCEDURE — 59025 PR FETAL 2N-STRESS TEST: ICD-10-PCS | Mod: 26,77,, | Performed by: PEDIATRICS

## 2019-02-21 PROCEDURE — 76819 PR US, OB, FETAL BIOPHYSICAL, W/O NST: ICD-10-PCS | Mod: S$GLB,,, | Performed by: PEDIATRICS

## 2019-02-21 PROCEDURE — 76816 OB US FOLLOW-UP PER FETUS: CPT | Mod: S$GLB,,, | Performed by: PEDIATRICS

## 2019-02-21 PROCEDURE — 99499 NO LOS: ICD-10-PCS | Mod: S$GLB,,, | Performed by: PEDIATRICS

## 2019-02-21 PROCEDURE — 59025 FETAL NON-STRESS TEST: CPT | Mod: 26,77,, | Performed by: PEDIATRICS

## 2019-02-21 PROCEDURE — 84156 ASSAY OF PROTEIN URINE: CPT

## 2019-02-21 PROCEDURE — 99213 PR OFFICE/OUTPT VISIT, EST, LEVL III, 20-29 MIN: ICD-10-PCS | Mod: 25,S$GLB,, | Performed by: PEDIATRICS

## 2019-02-21 PROCEDURE — 85025 COMPLETE CBC W/AUTO DIFF WBC: CPT

## 2019-02-21 PROCEDURE — 99284 PR EMERGENCY DEPT VISIT,LEVEL IV: ICD-10-PCS | Mod: 25,,, | Performed by: OBSTETRICS & GYNECOLOGY

## 2019-02-21 PROCEDURE — 99284 EMERGENCY DEPT VISIT MOD MDM: CPT | Mod: 25

## 2019-02-21 NOTE — PROGRESS NOTES
Indication  ========    fetal tachycardia.    History  ======    Risk Factors  History risk factors: Obesity  History risk factors: Chronic Hypertension    Maternal Assessment  =================    BP syst 175 mmHg  BP diast 93 mmHg  Other: 152/89, 150/92    Pregnancy  =========    Soto pregnancy. Number of fetuses: 1.    Dating  ======    Cycle: regular cycle  Assigned: Dating performed on 08/28/2018, based on the LMP  Assigned GA 32 w + 5 d  Assigned SAMUEL: 4/13/2019    Non Stress Test  =============    NST interpretation: reactive. Test duration 38 min. Baseline  bpm. Baseline variability: moderate. Accelerations: present.  Decelerations: absent. Uterine activity: absent. Acoustic stimulation: yes  reports + fetal movement, denies ctx, vag bleeding or loss of fluid            Impression  =========    discussed b/p w Dr Spencer; pt to [facility].    Sent to PNT for monitoring/NST secondary to elevated FHR on US with no relief. NST reactive and baseline normal.    BPs were elevated (see above). She is asymptomatic and PE is negative. She is chronic hypertensive on Norvasc 5 mg po daily.    Staff to notify Dr. Camp.

## 2019-02-21 NOTE — PROGRESS NOTES
Indication  ========    Evaluation of fetal growth/BPP.    History  ======    Risk Factors  History risk factors: Obesity  History risk factors: Chronic Hypertension    Method  ======    2D Color Doppler, , Voluson E10, Transabdominal ultrasound examination. View: Good view.    Pregnancy  =========    Soto pregnancy. Number of fetuses: 1.    Dating  ======    Cycle: regular cycle  Ultrasound examination on: 2/21/2019  GA by U/S based upon: AC, BPD, Femur, HC  GA by U/S 35 w + 2 d  SAMUEL by U/S: 3/26/2019  Assigned: Dating performed on 08/28/2018, based on the LMP  Assigned GA 32 w + 5 d  Assigned SAMUEL: 4/13/2019    General Evaluation  ==============    Cardiac activity: present.  bpm.  Fetal movements: visualized.  Presentation: cephalic.  Placenta: anterior.  Amniotic fluid: MVP 7.5 cm. BRITTANY 22.5 cm. Q1 7.5 cm, Q2 5.5 cm, Q3 3.9 cm, Q4 5.6 cm.    Biophysical Profile  ==============    2: Fetal breathing movements  2: Gross body movements  2: Fetal tone  2: Amniotic fluid volume  8/8: Biophysical profile score  Interpretation: normal    Fetal Biometry  ============    Fetal Biometry  BPD 85.6 mm 34w 4d Hadlock  .1 mm  .0 mm 36w 4d Hadlock  .1 mm 35w 2d Hadlock  Femur 67.7 mm 34w 6d Hadlock  EFW 2,613 g 62% Nigel  Calculated by: Hadlock (BPD-HC-AC-FL)  EFW (lb) 5 lb  EFW (oz) 12 oz  Cephalic index 0.74  HC / AC 1.03  FL / BPD 0.79  FL / AC 0.22  MVP 7.5 cm  BRITTANY 22.5 cm   bpm    Fetal Anatomy  ===========    Cranium: normal  4-chamber view: documented previously  Stomach: normal  Kidneys: normal  Bladder: normal  Wants to know gender: no  Other: A full anatomy survey previously performed.            Consultation  ==========    Chronic hypertensive for growth.   - 184. Did not lower.  Patient is on Norvasc and well controlled.  GDM A1.    Will send to PNT for NST/monitoring.            Impression  =========    Fetal size is AGA with the EFW at the 65th percentile.    Normal  repeat limited fetal anatomic survey.  AFV is normal.      I spent 10 minutes in direct consultation and care management with greater than 50% in face to face discussion.            Recommendation  ==============    Continue fetal surveillance; patient states that she is getting weekly NSTs with provider.  Growth in 4 weeks.    Thank you again for allowing us to participate in the care of your patients. If you have any questions concerning today's consultation, feel free  to contact me or one of my partners. We can be reached at [phone] during normal business hours. If you have a question after normal  business hours, please contact Labor and Delivery at [phone].

## 2019-02-21 NOTE — ED PROVIDER NOTES
Encounter Date: 2019       History     Chief Complaint   Patient presents with    Hypertension     HPI    Lore Garcia is a 33 y.o. [id number] at 32w5d presents complaining of elevated blood pressures. Patient reports she was in prenatal testing when she was told her blood pressures were in the 150s/90s. She states her Bps are usually in the 130s/80s. She was advised to come to the [facility] for further evaluation. Denies any HA, vision changes, CP/SOB, RUQ pain or edema.   This IUP is complicated by chronic HTN (on Norvasc 5 mg daily, last took at 0700).  Patient denies contractions, denies vaginal bleeding, denies LOF.   Fetal Movement: normal.     Review of patient's allergies indicates:   Allergen Reactions    Apple Swelling     Throat swelling  Itching in throat    Cherry Itching and Swelling     Throat swelling  Itching in throat    Ibuprofen Swelling     Face swelling     Past Medical History:   Diagnosis Date    Diabetes mellitus     Hypertension     Thyroid disease      Past Surgical History:   Procedure Laterality Date    MYOMECTOMY  2018    ROBOTIC ASSISTED LAPAROSCOPIC MYOMECTOMY N/A 2018    Performed [facility] at [facility]    THYROIDECTOMY, PARTIAL Left      Family History   Problem Relation Age of Onset    Breast cancer Neg Hx     Colon cancer Neg Hx     Ovarian cancer Neg Hx      Social History     Tobacco Use    Smoking status: Never Smoker    Smokeless tobacco: Never Used   Substance Use Topics    Alcohol use: No     Alcohol/week: 1.2 oz     Types: 2 Glasses of wine per week    Drug use: No     Review of Systems   Constitutional: Negative for chills, diaphoresis and fever.   HENT: Negative for congestion, postnasal drip, rhinorrhea, sneezing and sore throat.    Eyes: Negative for photophobia.   Respiratory: Negative for cough, chest tightness and shortness of breath.    Cardiovascular: Negative for chest pain, palpitations and leg swelling.    Gastrointestinal: Negative for abdominal pain, constipation, diarrhea, nausea and vomiting.   Genitourinary: Negative for dysuria, frequency and hematuria.   Musculoskeletal: Negative for arthralgias, back pain and joint swelling.   Neurological: Negative for syncope, light-headedness and headaches.   Psychiatric/Behavioral: Negative for self-injury, sleep disturbance and suicidal ideas. The patient is not nervous/anxious.        Physical Exam     Initial Vitals   BP Pulse Resp Temp SpO2   02/21/19 1242 02/21/19 1242 02/21/19 1242 02/21/19 1242 02/21/19 1243   (!) 146/77 (!) 117 17 98.3 °F (36.8 °C) 100 %      MAP       --                Physical Exam    Constitutional: Vital signs are normal. She appears well-developed and well-nourished. She is not diaphoretic. No distress.   HENT:   Head: Normocephalic and atraumatic.   Nose: Nose normal.   Eyes: Conjunctivae are normal. Right eye exhibits no discharge. Left eye exhibits no discharge.   Neck: Neck supple.   Cardiovascular: Normal rate, regular rhythm and normal heart sounds.   Pulmonary/Chest: Breath sounds normal. No respiratory distress. She has no wheezes.   Abdominal: Soft. There is no tenderness. There is no rebound and no guarding.   Neurological: She is alert and oriented to person, place, and time. She has normal reflexes. No cranial nerve deficit.   Skin: Skin is warm and dry. Capillary refill takes less than 2 seconds. No rash noted. No erythema.   Psychiatric: She has a normal mood and affect. Her behavior is normal. Judgment and thought content normal.     OB LABOR EXAM:   Pre-Term Labor: No.   Membranes ruptured: No.   Method: Sterile vaginal exam per MD.   Vaginal Bleeding: none present.   Engagement: ballotable/floating.   Dilatation: 0.   Station: -5.   Effacement: 40%.   Amniotic Fluid Color: no fluid.   Amniotic Fluid Amount: none noted.         ED Course   Fetal non-stress test  Date/Time: 2/21/2019 2:26 PM  Performed by: Gabriela Stewart  MD  Authorized by: Alissa Cornejo DO     Nonstress Test:     Variability:  6-25 BPM    Decelerations:  None    Accelerations:  15 bpm    Acoustic Stimulator: No      Baseline:  135    Uterine Irritability: No      Contractions:  Irregular  Biophysical Profile:     Nonstress Test Interpretation: reactive      Overall Impression:  Reassuring      Labs Reviewed   COMPREHENSIVE METABOLIC PANEL - Abnormal; Notable for the following components:       Result Value    CO2 18 (*)     BUN, Bld 4 (*)     Albumin 3.0 (*)     All other components within normal limits   CBC W/ AUTO DIFFERENTIAL   PROTEIN / CREATININE RATIO, URINE   POCT GLUCOSE          Imaging Results    None          Medical Decision Making:   History:   Old Records Summarized: records from clinic visits and records from previous admission(s).  ED Management:  Patient presenting with elevated Bps in prenatal testing on setting of chronic HTN  Patient usually takes Norvasc 5 mg and last took this AM at 0700  NST: 135, reactive/reassuring. CTX: q5 min however, patient not feeling them. Patient reports she did not drink very much water today. Contractions improved with PO hydration.  SVE: cl/thi/hi  CBC/CMP: WNL, P/C: 0.13  Patient discharged home with labor precautions. All questions answered, patient verbalized understanding. Has follow up with primary OBGYN next Thursday.               Attending Attestation:   Physician Attestation Statement for Resident:  As the supervising MD   Physician Attestation Statement: I have personally seen and examined this patient.   I agree with the above history. -:   As the supervising MD I agree with the above PE.    As the supervising MD I agree with the above treatment, course, plan, and disposition.  I was personally present during the critical portions of the procedure(s) performed by the resident and was immediately available in the ED to provide services and assistance as needed during the entire procedure.  I have  reviewed and agree with the residents interpretation of the following: lab data and rhythm strips.  I have reviewed the following: old records at this facility.                    ED Course as of Feb 27 1030   Thu Feb 21, 2019   1319 I evaluated Ms. Garcia and discussed case with Dr. Stewart.  Pt presents at 32w5d with elevated BP readings in PNT.  She has cHTN and took her norvasc 5mg today.  Here, BP's mild range.  She feels well w/o s/sx of pre-e.  No ob complaints.  Will follow up CMP and PCR.  CBC normal. Glucose 96.    Fetal status reactive and reassuring, baseline 135, contractions every 5-7 minutes, but not feeling them at all  Will d/c home once rest of labs come back  [HU]      ED Course User Index  [HU] Alissa Cornejo DO     Clinical Impression:       ICD-10-CM ICD-9-CM   1. Chronic hypertension I10 401.9   2. Maternal obesity affecting pregnancy, antepartum O99.210 649.13   3. 32 weeks gestation of pregnancy Z3A.32 V22.2   4. Diet controlled gestational diabetes mellitus (GDM) in third trimester O24.410 648.83         Disposition:   Disposition: Discharged  Condition: Stable                        Gabriela Stewart MD  Resident  02/21/19 1428       Alissa Cornejo DO  02/27/19 1031

## 2019-02-25 NOTE — PROGRESS NOTES
Indication  ========    fetal tachycardia.    History  ======    Risk Factors  History risk factors: Obesity  History risk factors: Chronic Hypertension    Maternal Assessment  =================    BP syst 175 mmHg  BP diast 93 mmHg  Other: 152/89, 150/92    Pregnancy  =========    Soto pregnancy. Number of fetuses: 1.    Dating  ======    Cycle: regular cycle  Assigned: Dating performed on 08/28/2018, based on the LMP  Assigned GA 32 w + 5 d  Assigned SAMUEL: 4/13/2019    Non Stress Test  =============    NST interpretation: reactive. Test duration 38 min. Baseline  bpm. Baseline variability: moderate. Accelerations: present.  Decelerations: absent. Uterine activity: absent. Acoustic stimulation: yes. Number of stimulations: 1. Stimulation response: acceleration  reports + fetal movement, denies ctx, vag bleeding or loss of fluid    Impression  =========    discussed b/p w Dr Spencer; pt to [facility].    Sent to PNT for monitoring/NST secondary to elevated FHR on US with no relief. NST reactive and baseline normal.    BPs were elevated (see above). She is asymptomatic and PE is negative. She is chronic hypertensive on Norvasc 5 mg po daily.    Staff to notify Dr. Camp.

## 2019-02-28 ENCOUNTER — HOSPITAL ENCOUNTER (OUTPATIENT)
Dept: PERINATAL CARE | Facility: OTHER | Age: 34
Discharge: HOME | End: 2019-02-28
Attending: ADVANCED PRACTICE MIDWIFE
Payer: COMMERCIAL

## 2019-02-28 ENCOUNTER — TELEPHONE (OUTPATIENT)
Dept: OBSTETRICS AND GYNECOLOGY | Facility: CLINIC | Age: 34
End: 2019-02-28

## 2019-02-28 ENCOUNTER — ROUTINE PRENATAL (OUTPATIENT)
Dept: OBSTETRICS AND GYNECOLOGY | Facility: CLINIC | Age: 34
End: 2019-02-28
Attending: OBSTETRICS & GYNECOLOGY
Payer: COMMERCIAL

## 2019-02-28 ENCOUNTER — PATIENT MESSAGE (OUTPATIENT)
Dept: OBSTETRICS AND GYNECOLOGY | Facility: CLINIC | Age: 34
End: 2019-02-28

## 2019-02-28 VITALS
BODY MASS INDEX: 44.39 KG/M2 | SYSTOLIC BLOOD PRESSURE: 140 MMHG | DIASTOLIC BLOOD PRESSURE: 86 MMHG | WEIGHT: 266.75 LBS

## 2019-02-28 DIAGNOSIS — Z98.890 HISTORY OF MYOMECTOMY: Chronic | ICD-10-CM

## 2019-02-28 DIAGNOSIS — O24.410 DIET CONTROLLED GESTATIONAL DIABETES MELLITUS (GDM) IN THIRD TRIMESTER: ICD-10-CM

## 2019-02-28 DIAGNOSIS — E66.01 MORBID OBESITY WITH BMI OF 40.0-44.9, ADULT: ICD-10-CM

## 2019-02-28 DIAGNOSIS — O10.919 CHRONIC HYPERTENSION AFFECTING PREGNANCY: ICD-10-CM

## 2019-02-28 DIAGNOSIS — Z34.90 NORMAL REPEAT PREGNANCY, ANTEPARTUM: Primary | ICD-10-CM

## 2019-02-28 DIAGNOSIS — O09.299 PRIOR PREGNANCY WITH FETAL DEMISE: ICD-10-CM

## 2019-02-28 DIAGNOSIS — Z3A.29 29 WEEKS GESTATION OF PREGNANCY: ICD-10-CM

## 2019-02-28 DIAGNOSIS — E66.01 MORBID OBESITY: ICD-10-CM

## 2019-02-28 DIAGNOSIS — E03.9 HYPOTHYROIDISM, UNSPECIFIED TYPE: ICD-10-CM

## 2019-02-28 PROCEDURE — 0502F PR SUBSEQUENT PRENATAL CARE: ICD-10-PCS | Mod: CPTII,S$GLB,, | Performed by: OBSTETRICS & GYNECOLOGY

## 2019-02-28 PROCEDURE — 59025 FETAL NON-STRESS TEST: CPT | Mod: 26,,, | Performed by: OBSTETRICS & GYNECOLOGY

## 2019-02-28 PROCEDURE — 59025 FETAL NON-STRESS TEST: CPT

## 2019-02-28 PROCEDURE — 0502F SUBSEQUENT PRENATAL CARE: CPT | Mod: CPTII,S$GLB,, | Performed by: OBSTETRICS & GYNECOLOGY

## 2019-02-28 PROCEDURE — 99999 PR PBB SHADOW E&M-EST. PATIENT-LVL II: ICD-10-PCS | Mod: PBBFAC,,, | Performed by: OBSTETRICS & GYNECOLOGY

## 2019-02-28 PROCEDURE — 76815 OB US LIMITED FETUS(S): CPT

## 2019-02-28 PROCEDURE — 99999 PR PBB SHADOW E&M-EST. PATIENT-LVL II: CPT | Mod: PBBFAC,,, | Performed by: OBSTETRICS & GYNECOLOGY

## 2019-02-28 PROCEDURE — 76815 OB US LIMITED FETUS(S): CPT | Mod: 26,,, | Performed by: OBSTETRICS & GYNECOLOGY

## 2019-02-28 PROCEDURE — 59025 PR FETAL 2N-STRESS TEST: ICD-10-PCS | Mod: 26,,, | Performed by: OBSTETRICS & GYNECOLOGY

## 2019-02-28 PROCEDURE — 76815 PR  US,PREGNANT UTERUS,LIMITED, 1/> FETUSES: ICD-10-PCS | Mod: 26,,, | Performed by: OBSTETRICS & GYNECOLOGY

## 2019-02-28 RX ORDER — LANCETS 30 GAUGE
EACH MISCELLANEOUS
Refills: 0 | COMMUNITY
Start: 2019-02-14

## 2019-02-28 NOTE — TELEPHONE ENCOUNTER
----- Message from Elicia Ruiz MD sent at 2/28/2019  1:23 PM CST -----  Please schedule Primary C/S secondary to myomectomy on 3/25 at 10.  Thanks.

## 2019-02-28 NOTE — PROGRESS NOTES
No problem.  No headaches, no blurry vision.  No vaginal bleeding, no loss of fluid, positive fetal movement, no contractions.  She forgot her BS log - will send through [facility].  Bleeding/labor/rom/fmc precautions.

## 2019-03-07 ENCOUNTER — HOSPITAL ENCOUNTER (OUTPATIENT)
Dept: PERINATAL CARE | Facility: OTHER | Age: 34
Discharge: HOME | End: 2019-03-07
Attending: ADVANCED PRACTICE MIDWIFE
Payer: COMMERCIAL

## 2019-03-07 DIAGNOSIS — E66.01 MORBID OBESITY: ICD-10-CM

## 2019-03-07 DIAGNOSIS — O09.299 PRIOR PREGNANCY WITH FETAL DEMISE: ICD-10-CM

## 2019-03-07 DIAGNOSIS — Z3A.29 29 WEEKS GESTATION OF PREGNANCY: ICD-10-CM

## 2019-03-07 PROCEDURE — 59025 PR FETAL 2N-STRESS TEST: ICD-10-PCS | Mod: 26,,, | Performed by: PEDIATRICS

## 2019-03-07 PROCEDURE — 59025 FETAL NON-STRESS TEST: CPT

## 2019-03-07 PROCEDURE — 76815 PR  US,PREGNANT UTERUS,LIMITED, 1/> FETUSES: ICD-10-PCS | Mod: 26,,, | Performed by: PEDIATRICS

## 2019-03-07 PROCEDURE — 76815 OB US LIMITED FETUS(S): CPT | Mod: 26,,, | Performed by: PEDIATRICS

## 2019-03-07 PROCEDURE — 76815 OB US LIMITED FETUS(S): CPT

## 2019-03-07 PROCEDURE — 59025 FETAL NON-STRESS TEST: CPT | Mod: 26,,, | Performed by: PEDIATRICS

## 2019-03-07 NOTE — PROGRESS NOTES
Indication  ========    Obesity, CHTN.    History  ======    Risk Factors  History risk factors: Obesity  History risk factors: Chronic Hypertension    Maternal Assessment  =================    BP syst 139 mmHg  BP diast 90 mmHg    Method  ======    Transabdominal ultrasound examination, Voluson S8. View: Sufficient.    Pregnancy  =========    Soto pregnancy. Number of fetuses: 1.    Dating  ======    Cycle: regular cycle  Assigned: Dating performed on 08/28/2018, based on the LMP  Assigned GA 34 w + 5 d  Assigned SAMUEL: 4/13/2019    General Evaluation  ==============    Cardiac activity: present.  Fetal movements: visualized.  Presentation: cephalic.  Placenta: anterior.  Umbilical cord: 3 vessel cord.    Non Stress Test  =============    NST interpretation: reactive. Test duration 23 min. Baseline  bpm. Baseline variability: moderate. Accelerations: present.  Decelerations: absent. Uterine activity: absent. Acoustic stimulation: no  reports + fetal movement, denies ctx, vag bleeding or loss of fluid. Denies headache, visual changes or epigastric pain    Amniotic Fluid Assessment  =====================    Amount of AF: normal amount  MVP 6.0 cm          Impression  =========    NST R.  MVP normal.              Recommendation  ==============    Continue fetal surveillance as previously outlined

## 2019-03-07 NOTE — TELEPHONE ENCOUNTER
Can you call the [facility] clinic to see if they have them?  I can't find them on [facility] where I used to get them

## 2019-03-08 ENCOUNTER — PATIENT MESSAGE (OUTPATIENT)
Dept: OBSTETRICS AND GYNECOLOGY | Facility: CLINIC | Age: 34
End: 2019-03-08

## 2019-03-08 ENCOUNTER — TELEPHONE (OUTPATIENT)
Dept: OBSTETRICS AND GYNECOLOGY | Facility: CLINIC | Age: 34
End: 2019-03-08

## 2019-03-08 DIAGNOSIS — O24.414 INSULIN CONTROLLED GESTATIONAL DIABETES MELLITUS (GDM) IN THIRD TRIMESTER: Primary | ICD-10-CM

## 2019-03-08 RX ORDER — PEN NEEDLE, DIABETIC 33 GX5/32"
1 NEEDLE, DISPOSABLE MISCELLANEOUS 3 TIMES DAILY
Qty: 100 EACH | Refills: 1 | Status: SHIPPED | OUTPATIENT
Start: 2019-03-08

## 2019-03-11 NOTE — TELEPHONE ENCOUNTER
All supplies (Regular and NPH insulin, needles and syringes) were ordered and sent on Friday - please see her medication record.  Please call her pharmacy to see why they don't see the orders.

## 2019-03-12 ENCOUNTER — PATIENT MESSAGE (OUTPATIENT)
Dept: OBSTETRICS AND GYNECOLOGY | Facility: CLINIC | Age: 34
End: 2019-03-12

## 2019-03-14 ENCOUNTER — TELEPHONE (OUTPATIENT)
Dept: OBSTETRICS AND GYNECOLOGY | Facility: CLINIC | Age: 34
End: 2019-03-14

## 2019-03-14 ENCOUNTER — ROUTINE PRENATAL (OUTPATIENT)
Dept: OBSTETRICS AND GYNECOLOGY | Facility: CLINIC | Age: 34
End: 2019-03-14
Attending: OBSTETRICS & GYNECOLOGY
Payer: COMMERCIAL

## 2019-03-14 ENCOUNTER — HOSPITAL ENCOUNTER (OUTPATIENT)
Dept: PERINATAL CARE | Facility: OTHER | Age: 34
Discharge: HOME | End: 2019-03-14
Attending: NURSE PRACTITIONER
Payer: COMMERCIAL

## 2019-03-14 VITALS
WEIGHT: 264.13 LBS | SYSTOLIC BLOOD PRESSURE: 142 MMHG | DIASTOLIC BLOOD PRESSURE: 88 MMHG | BODY MASS INDEX: 43.95 KG/M2

## 2019-03-14 DIAGNOSIS — O09.299 PRIOR PREGNANCY WITH FETAL DEMISE: ICD-10-CM

## 2019-03-14 DIAGNOSIS — E03.9 HYPOTHYROIDISM, UNSPECIFIED TYPE: ICD-10-CM

## 2019-03-14 DIAGNOSIS — E66.01 MORBID OBESITY: ICD-10-CM

## 2019-03-14 DIAGNOSIS — O24.414 INSULIN CONTROLLED GESTATIONAL DIABETES MELLITUS (GDM) IN THIRD TRIMESTER: ICD-10-CM

## 2019-03-14 DIAGNOSIS — E66.01 MORBID OBESITY WITH BMI OF 40.0-44.9, ADULT: ICD-10-CM

## 2019-03-14 DIAGNOSIS — Z34.90 NORMAL REPEAT PREGNANCY, ANTEPARTUM: Primary | ICD-10-CM

## 2019-03-14 DIAGNOSIS — O10.919 CHRONIC HYPERTENSION AFFECTING PREGNANCY: ICD-10-CM

## 2019-03-14 DIAGNOSIS — Z3A.29 29 WEEKS GESTATION OF PREGNANCY: ICD-10-CM

## 2019-03-14 DIAGNOSIS — Z98.890 HISTORY OF MYOMECTOMY: Chronic | ICD-10-CM

## 2019-03-14 PROBLEM — O24.410 DIET CONTROLLED GESTATIONAL DIABETES MELLITUS (GDM) IN THIRD TRIMESTER: Status: RESOLVED | Noted: 2019-01-25 | Resolved: 2019-03-14

## 2019-03-14 PROCEDURE — 59025 FETAL NON-STRESS TEST: CPT | Mod: 26,,, | Performed by: OBSTETRICS & GYNECOLOGY

## 2019-03-14 PROCEDURE — 87081 CULTURE SCREEN ONLY: CPT

## 2019-03-14 PROCEDURE — 59025 PR FETAL 2N-STRESS TEST: ICD-10-PCS | Mod: 26,,, | Performed by: OBSTETRICS & GYNECOLOGY

## 2019-03-14 PROCEDURE — 99999 PR PBB SHADOW E&M-EST. PATIENT-LVL II: ICD-10-PCS | Mod: PBBFAC,,, | Performed by: OBSTETRICS & GYNECOLOGY

## 2019-03-14 PROCEDURE — 99999 PR PBB SHADOW E&M-EST. PATIENT-LVL II: CPT | Mod: PBBFAC,,, | Performed by: OBSTETRICS & GYNECOLOGY

## 2019-03-14 PROCEDURE — 76815 PR  US,PREGNANT UTERUS,LIMITED, 1/> FETUSES: ICD-10-PCS | Mod: 26,,, | Performed by: OBSTETRICS & GYNECOLOGY

## 2019-03-14 PROCEDURE — 0502F PR SUBSEQUENT PRENATAL CARE: ICD-10-PCS | Mod: CPTII,S$GLB,, | Performed by: OBSTETRICS & GYNECOLOGY

## 2019-03-14 PROCEDURE — 76815 OB US LIMITED FETUS(S): CPT

## 2019-03-14 PROCEDURE — 76815 OB US LIMITED FETUS(S): CPT | Mod: 26,,, | Performed by: OBSTETRICS & GYNECOLOGY

## 2019-03-14 PROCEDURE — 59025 FETAL NON-STRESS TEST: CPT

## 2019-03-14 PROCEDURE — 0502F SUBSEQUENT PRENATAL CARE: CPT | Mod: CPTII,S$GLB,, | Performed by: OBSTETRICS & GYNECOLOGY

## 2019-03-14 RX ORDER — PEN NEEDLE, DIABETIC 31 GX5/16"
NEEDLE, DISPOSABLE MISCELLANEOUS
COMMUNITY
Start: 2019-03-08

## 2019-03-14 NOTE — TELEPHONE ENCOUNTER
Spoke with Al the pharmacist at the patient's pharmacy and he stated that they did not receive the electronic prescription for the insulin NPH 17 units or 33 units. A verbal order was given and the prescription will be filled for the patient to  today. Al stated that he cannot give the patient two of the same prescription and that the vial will be equivalent to her dose. Verbalized understanding with Al.

## 2019-03-14 NOTE — PROGRESS NOTES
No problem.  No vaginal bleeding, no loss of fluid, positive fetal movement, no contractions.  No headaches, no blurry vision.  Received regular insulin but was never given NPH from pharmacy.  Called pharmacy - prescription not received electronically but verbal order accepted and will fill for patient.  Patient to submit BS log on Tuesday to determine if adjustment is needed.  GBS, HIV, RPR, CBC today.  Bleeding/labor/rom/fmc/pih precautions.

## 2019-03-16 LAB — BACTERIA SPEC AEROBE CULT: NORMAL

## 2019-03-18 ENCOUNTER — PATIENT MESSAGE (OUTPATIENT)
Dept: OBSTETRICS AND GYNECOLOGY | Facility: CLINIC | Age: 34
End: 2019-03-18

## 2019-03-21 ENCOUNTER — LAB VISIT (OUTPATIENT)
Dept: LAB | Facility: OTHER | Age: 34
End: 2019-03-21
Attending: OBSTETRICS & GYNECOLOGY
Payer: COMMERCIAL

## 2019-03-21 ENCOUNTER — PROCEDURE VISIT (OUTPATIENT)
Dept: MATERNAL FETAL MEDICINE | Facility: CLINIC | Age: 34
End: 2019-03-21
Payer: COMMERCIAL

## 2019-03-21 ENCOUNTER — ROUTINE PRENATAL (OUTPATIENT)
Dept: OBSTETRICS AND GYNECOLOGY | Facility: CLINIC | Age: 34
End: 2019-03-21
Attending: OBSTETRICS & GYNECOLOGY
Payer: COMMERCIAL

## 2019-03-21 VITALS
BODY MASS INDEX: 45.09 KG/M2 | SYSTOLIC BLOOD PRESSURE: 144 MMHG | WEIGHT: 270.94 LBS | DIASTOLIC BLOOD PRESSURE: 80 MMHG

## 2019-03-21 DIAGNOSIS — O10.919 CHRONIC HYPERTENSION AFFECTING PREGNANCY: ICD-10-CM

## 2019-03-21 DIAGNOSIS — E03.9 HYPOTHYROIDISM, UNSPECIFIED TYPE: ICD-10-CM

## 2019-03-21 DIAGNOSIS — Z98.890 HISTORY OF MYOMECTOMY: Chronic | ICD-10-CM

## 2019-03-21 DIAGNOSIS — Z36.89 ENCOUNTER FOR ULTRASOUND TO CHECK FETAL GROWTH: ICD-10-CM

## 2019-03-21 DIAGNOSIS — O09.299 PRIOR PREGNANCY WITH FETAL DEMISE: ICD-10-CM

## 2019-03-21 DIAGNOSIS — O24.414 INSULIN CONTROLLED GESTATIONAL DIABETES MELLITUS (GDM) IN THIRD TRIMESTER: ICD-10-CM

## 2019-03-21 DIAGNOSIS — E66.01 MORBID OBESITY WITH BMI OF 40.0-44.9, ADULT: ICD-10-CM

## 2019-03-21 DIAGNOSIS — Z34.90 NORMAL REPEAT PREGNANCY, ANTEPARTUM: Primary | ICD-10-CM

## 2019-03-21 DIAGNOSIS — Z34.90 NORMAL REPEAT PREGNANCY, ANTEPARTUM: ICD-10-CM

## 2019-03-21 LAB
BASOPHILS # BLD AUTO: 0.01 K/UL
BASOPHILS NFR BLD: 0.2 %
DIFFERENTIAL METHOD: ABNORMAL
EOSINOPHIL # BLD AUTO: 0.1 K/UL
EOSINOPHIL NFR BLD: 2 %
ERYTHROCYTE [DISTWIDTH] IN BLOOD BY AUTOMATED COUNT: 14.6 %
HCT VFR BLD AUTO: 40.1 %
HGB BLD-MCNC: 13.2 G/DL
LYMPHOCYTES # BLD AUTO: 2.3 K/UL
LYMPHOCYTES NFR BLD: 36 %
MCH RBC QN AUTO: 28.8 PG
MCHC RBC AUTO-ENTMCNC: 32.9 G/DL
MCV RBC AUTO: 87 FL
MONOCYTES # BLD AUTO: 0.8 K/UL
MONOCYTES NFR BLD: 12.9 %
NEUTROPHILS # BLD AUTO: 3.1 K/UL
NEUTROPHILS NFR BLD: 48.6 %
PLATELET # BLD AUTO: 280 K/UL
PMV BLD AUTO: 9.9 FL
RBC # BLD AUTO: 4.59 M/UL
T4 FREE SERPL-MCNC: 0.75 NG/DL
TSH SERPL DL<=0.005 MIU/L-ACNC: 1.25 UIU/ML
WBC # BLD AUTO: 6.36 K/UL

## 2019-03-21 PROCEDURE — 0502F SUBSEQUENT PRENATAL CARE: CPT | Mod: CPTII,S$GLB,, | Performed by: OBSTETRICS & GYNECOLOGY

## 2019-03-21 PROCEDURE — 0502F PR SUBSEQUENT PRENATAL CARE: ICD-10-PCS | Mod: CPTII,S$GLB,, | Performed by: OBSTETRICS & GYNECOLOGY

## 2019-03-21 PROCEDURE — 86703 HIV-1/HIV-2 1 RESULT ANTBDY: CPT

## 2019-03-21 PROCEDURE — 99999 PR PBB SHADOW E&M-EST. PATIENT-LVL III: CPT | Mod: PBBFAC,,, | Performed by: OBSTETRICS & GYNECOLOGY

## 2019-03-21 PROCEDURE — 36415 COLL VENOUS BLD VENIPUNCTURE: CPT

## 2019-03-21 PROCEDURE — 76816 OB US FOLLOW-UP PER FETUS: CPT | Mod: S$GLB,,, | Performed by: OBSTETRICS & GYNECOLOGY

## 2019-03-21 PROCEDURE — 99499 NO LOS: ICD-10-PCS | Mod: S$GLB,,, | Performed by: OBSTETRICS & GYNECOLOGY

## 2019-03-21 PROCEDURE — 84443 ASSAY THYROID STIM HORMONE: CPT

## 2019-03-21 PROCEDURE — 86592 SYPHILIS TEST NON-TREP QUAL: CPT

## 2019-03-21 PROCEDURE — 84439 ASSAY OF FREE THYROXINE: CPT

## 2019-03-21 PROCEDURE — 76816 PR  US,PREGNANT UTERUS,F/U,TRANSABD APP: ICD-10-PCS | Mod: S$GLB,,, | Performed by: OBSTETRICS & GYNECOLOGY

## 2019-03-21 PROCEDURE — 99499 UNLISTED E&M SERVICE: CPT | Mod: S$GLB,,, | Performed by: OBSTETRICS & GYNECOLOGY

## 2019-03-21 PROCEDURE — 99999 PR PBB SHADOW E&M-EST. PATIENT-LVL III: ICD-10-PCS | Mod: PBBFAC,,, | Performed by: OBSTETRICS & GYNECOLOGY

## 2019-03-21 PROCEDURE — 85025 COMPLETE CBC W/AUTO DIFF WBC: CPT

## 2019-03-21 RX ORDER — SYRING-NEEDL,DISP,INSUL,0.3 ML 31GX15/64"
SYRINGE, EMPTY DISPOSABLE MISCELLANEOUS
Refills: 0 | COMMUNITY
Start: 2019-03-14

## 2019-03-21 RX ORDER — INSULIN HUMAN 100 [IU]/ML
INJECTION, SUSPENSION SUBCUTANEOUS
Refills: 1 | COMMUNITY
Start: 2019-03-14

## 2019-03-21 NOTE — PROGRESS NOTES
No problem.  No vaginal bleeding, no loss of fluid, positive fetal movement, no contractions.  No headaches, no blurry vision.  GBS negative.  Labs today.  C/S on Monday - instructions given.  BS reviewed - wnl with a few exceptions, which were diet related.  MFM sono today.  Bleeding/labor/rom/fmc/pih precautions.

## 2019-03-22 LAB
HIV 1+2 AB+HIV1 P24 AG SERPL QL IA: NEGATIVE
RPR SER QL: NORMAL

## 2019-03-25 ENCOUNTER — HOSPITAL ENCOUNTER (INPATIENT)
Facility: OTHER | Age: 34
LOS: 3 days | Discharge: HOME | End: 2019-03-28
Attending: OBSTETRICS & GYNECOLOGY | Admitting: OBSTETRICS & GYNECOLOGY
Payer: COMMERCIAL

## 2019-03-25 ENCOUNTER — ANESTHESIA (OUTPATIENT)
Dept: OBSTETRICS AND GYNECOLOGY | Facility: OTHER | Age: 34
End: 2019-03-25
Payer: COMMERCIAL

## 2019-03-25 ENCOUNTER — ANESTHESIA EVENT (OUTPATIENT)
Dept: OBSTETRICS AND GYNECOLOGY | Facility: OTHER | Age: 34
End: 2019-03-25
Payer: COMMERCIAL

## 2019-03-25 DIAGNOSIS — Z98.890 HISTORY OF MYOMECTOMY: Chronic | ICD-10-CM

## 2019-03-25 DIAGNOSIS — Z98.891 S/P CESAREAN SECTION: Primary | ICD-10-CM

## 2019-03-25 DIAGNOSIS — Z98.891 HISTORY OF CESAREAN DELIVERY: ICD-10-CM

## 2019-03-25 LAB
ABO + RH BLD: NORMAL
BASOPHILS # BLD AUTO: 0.02 K/UL (ref 0–0.2)
BASOPHILS NFR BLD: 0.2 % (ref 0–1.9)
BLD GP AB SCN CELLS X3 SERPL QL: NORMAL
DIFFERENTIAL METHOD: NORMAL
EOSINOPHIL # BLD AUTO: 0.1 K/UL (ref 0–0.5)
EOSINOPHIL NFR BLD: 1 % (ref 0–8)
ERYTHROCYTE [DISTWIDTH] IN BLOOD BY AUTOMATED COUNT: 14.5 % (ref 11.5–14.5)
HCT VFR BLD AUTO: 37.6 % (ref 37–48.5)
HGB BLD-MCNC: 12.7 G/DL (ref 12–16)
LYMPHOCYTES # BLD AUTO: 2 K/UL (ref 1–4.8)
LYMPHOCYTES NFR BLD: 24.7 % (ref 18–48)
MCH RBC QN AUTO: 29.3 PG (ref 27–31)
MCHC RBC AUTO-ENTMCNC: 33.8 G/DL (ref 32–36)
MCV RBC AUTO: 87 FL (ref 82–98)
MONOCYTES # BLD AUTO: 0.8 K/UL (ref 0.3–1)
MONOCYTES NFR BLD: 9.4 % (ref 4–15)
NEUTROPHILS # BLD AUTO: 5.2 K/UL (ref 1.8–7.7)
NEUTROPHILS NFR BLD: 64.2 % (ref 38–73)
PLATELET # BLD AUTO: 286 K/UL (ref 150–350)
PMV BLD AUTO: 10 FL (ref 9.2–12.9)
POCT GLUCOSE: 75 MG/DL (ref 70–110)
POCT GLUCOSE: 76 MG/DL (ref 70–110)
RBC # BLD AUTO: 4.33 M/UL (ref 4–5.4)
WBC # BLD AUTO: 8.07 K/UL (ref 3.9–12.7)

## 2019-03-25 PROCEDURE — 86850 RBC ANTIBODY SCREEN: CPT

## 2019-03-25 PROCEDURE — 36004724 HC OB OR TIME LEV III - 1ST 15 MIN: Performed by: OBSTETRICS & GYNECOLOGY

## 2019-03-25 PROCEDURE — 25000003 PHARM REV CODE 250: Performed by: STUDENT IN AN ORGANIZED HEALTH CARE EDUCATION/TRAINING PROGRAM

## 2019-03-25 PROCEDURE — 25000003 PHARM REV CODE 250: Performed by: ANESTHESIOLOGY

## 2019-03-25 PROCEDURE — 11000001 HC ACUTE MED/SURG PRIVATE ROOM

## 2019-03-25 PROCEDURE — 37000008 HC ANESTHESIA 1ST 15 MINUTES: Performed by: OBSTETRICS & GYNECOLOGY

## 2019-03-25 PROCEDURE — 59510 CESAREAN DELIVERY: CPT | Mod: ,,, | Performed by: OBSTETRICS & GYNECOLOGY

## 2019-03-25 PROCEDURE — 85025 COMPLETE CBC W/AUTO DIFF WBC: CPT

## 2019-03-25 PROCEDURE — 59510 PR FULL ROUT OBSTE CARE,CESAREAN DELIV: ICD-10-PCS | Mod: ,,, | Performed by: OBSTETRICS & GYNECOLOGY

## 2019-03-25 PROCEDURE — 63600175 PHARM REV CODE 636 W HCPCS: Performed by: ANESTHESIOLOGY

## 2019-03-25 PROCEDURE — 71000039 HC RECOVERY, EACH ADD'L HOUR: Performed by: OBSTETRICS & GYNECOLOGY

## 2019-03-25 PROCEDURE — 71000033 HC RECOVERY, INTIAL HOUR: Performed by: OBSTETRICS & GYNECOLOGY

## 2019-03-25 PROCEDURE — 37000009 HC ANESTHESIA EA ADD 15 MINS: Performed by: OBSTETRICS & GYNECOLOGY

## 2019-03-25 PROCEDURE — 59514 PRA REAN DELIVERY ONLY: ICD-10-PCS | Mod: ,,, | Performed by: ANESTHESIOLOGY

## 2019-03-25 PROCEDURE — 59514 CESAREAN DELIVERY ONLY: CPT | Mod: ,,, | Performed by: ANESTHESIOLOGY

## 2019-03-25 PROCEDURE — 36004725 HC OB OR TIME LEV III - EA ADD 15 MIN: Performed by: OBSTETRICS & GYNECOLOGY

## 2019-03-25 PROCEDURE — 63600175 PHARM REV CODE 636 W HCPCS: Performed by: STUDENT IN AN ORGANIZED HEALTH CARE EDUCATION/TRAINING PROGRAM

## 2019-03-25 PROCEDURE — 51702 INSERT TEMP BLADDER CATH: CPT

## 2019-03-25 RX ORDER — HYDROCORTISONE 25 MG/G
CREAM TOPICAL 3 TIMES DAILY PRN
Status: DISCONTINUED | OUTPATIENT
Start: 2019-03-25 | End: 2019-03-28 | Stop reason: HOSPADM

## 2019-03-25 RX ORDER — MORPHINE SULFATE 0.5 MG/ML
INJECTION, SOLUTION EPIDURAL; INTRATHECAL; INTRAVENOUS
Status: DISCONTINUED | OUTPATIENT
Start: 2019-03-25 | End: 2019-03-25

## 2019-03-25 RX ORDER — SIMETHICONE 80 MG
1 TABLET,CHEWABLE ORAL EVERY 6 HOURS PRN
Status: DISCONTINUED | OUTPATIENT
Start: 2019-03-25 | End: 2019-03-28 | Stop reason: HOSPADM

## 2019-03-25 RX ORDER — OXYTOCIN 10 [USP'U]/ML
INJECTION, SOLUTION INTRAMUSCULAR; INTRAVENOUS
Status: DISCONTINUED | OUTPATIENT
Start: 2019-03-25 | End: 2019-03-25

## 2019-03-25 RX ORDER — DIPHENHYDRAMINE HCL 25 MG
25 CAPSULE ORAL EVERY 4 HOURS PRN
Status: DISCONTINUED | OUTPATIENT
Start: 2019-03-25 | End: 2019-03-28 | Stop reason: HOSPADM

## 2019-03-25 RX ORDER — PHENYLEPHRINE HYDROCHLORIDE 10 MG/ML
INJECTION INTRAVENOUS
Status: DISCONTINUED | OUTPATIENT
Start: 2019-03-25 | End: 2019-03-25

## 2019-03-25 RX ORDER — OXYTOCIN/RINGER'S LACTATE 20/1000 ML
333 PLASTIC BAG, INJECTION (ML) INTRAVENOUS CONTINUOUS
Status: DISCONTINUED | OUTPATIENT
Start: 2019-03-25 | End: 2019-03-25

## 2019-03-25 RX ORDER — BUPIVACAINE HYDROCHLORIDE 7.5 MG/ML
INJECTION, SOLUTION INTRASPINAL
Status: DISCONTINUED | OUTPATIENT
Start: 2019-03-25 | End: 2019-03-25

## 2019-03-25 RX ORDER — SODIUM CHLORIDE, SODIUM LACTATE, POTASSIUM CHLORIDE, CALCIUM CHLORIDE 600; 310; 30; 20 MG/100ML; MG/100ML; MG/100ML; MG/100ML
INJECTION, SOLUTION INTRAVENOUS CONTINUOUS
Status: ACTIVE | OUTPATIENT
Start: 2019-03-25 | End: 2019-03-26

## 2019-03-25 RX ORDER — OXYTOCIN/RINGER'S LACTATE 20/1000 ML
41.7 PLASTIC BAG, INJECTION (ML) INTRAVENOUS CONTINUOUS
Status: DISCONTINUED | OUTPATIENT
Start: 2019-03-25 | End: 2019-03-25

## 2019-03-25 RX ORDER — IBUPROFEN 600 MG/1
600 TABLET ORAL EVERY 6 HOURS
Status: DISCONTINUED | OUTPATIENT
Start: 2019-03-26 | End: 2019-03-25

## 2019-03-25 RX ORDER — ACETAMINOPHEN 10 MG/ML
INJECTION, SOLUTION INTRAVENOUS
Status: DISCONTINUED | OUTPATIENT
Start: 2019-03-25 | End: 2019-03-25

## 2019-03-25 RX ORDER — KETOROLAC TROMETHAMINE 30 MG/ML
30 INJECTION, SOLUTION INTRAMUSCULAR; INTRAVENOUS EVERY 6 HOURS
Status: CANCELLED | OUTPATIENT
Start: 2019-03-25 | End: 2019-03-26

## 2019-03-25 RX ORDER — OXYCODONE HYDROCHLORIDE 5 MG/1
10 TABLET ORAL EVERY 4 HOURS PRN
Status: DISPENSED | OUTPATIENT
Start: 2019-03-25 | End: 2019-03-26

## 2019-03-25 RX ORDER — ONDANSETRON 2 MG/ML
4 INJECTION INTRAMUSCULAR; INTRAVENOUS EVERY 6 HOURS PRN
Status: ACTIVE | OUTPATIENT
Start: 2019-03-25 | End: 2019-03-26

## 2019-03-25 RX ORDER — AMOXICILLIN 250 MG
1 CAPSULE ORAL NIGHTLY PRN
Status: DISCONTINUED | OUTPATIENT
Start: 2019-03-25 | End: 2019-03-28 | Stop reason: HOSPADM

## 2019-03-25 RX ORDER — FENTANYL CITRATE 50 UG/ML
INJECTION, SOLUTION INTRAMUSCULAR; INTRAVENOUS
Status: DISCONTINUED | OUTPATIENT
Start: 2019-03-25 | End: 2019-03-25

## 2019-03-25 RX ORDER — ACETAMINOPHEN 325 MG/1
650 TABLET ORAL EVERY 6 HOURS
Status: DISCONTINUED | OUTPATIENT
Start: 2019-03-25 | End: 2019-03-26

## 2019-03-25 RX ORDER — SODIUM CITRATE AND CITRIC ACID MONOHYDRATE 334; 500 MG/5ML; MG/5ML
30 SOLUTION ORAL
Status: DISCONTINUED | OUTPATIENT
Start: 2019-03-25 | End: 2019-03-25

## 2019-03-25 RX ORDER — ADHESIVE BANDAGE
30 BANDAGE TOPICAL 2 TIMES DAILY PRN
Status: DISCONTINUED | OUTPATIENT
Start: 2019-03-26 | End: 2019-03-28 | Stop reason: HOSPADM

## 2019-03-25 RX ORDER — DOCUSATE SODIUM 100 MG/1
200 CAPSULE, LIQUID FILLED ORAL 2 TIMES DAILY
Status: DISCONTINUED | OUTPATIENT
Start: 2019-03-25 | End: 2019-03-28 | Stop reason: HOSPADM

## 2019-03-25 RX ORDER — OXYCODONE HYDROCHLORIDE 5 MG/1
5 TABLET ORAL EVERY 4 HOURS PRN
Status: ACTIVE | OUTPATIENT
Start: 2019-03-25 | End: 2019-03-26

## 2019-03-25 RX ORDER — ONDANSETRON 8 MG/1
8 TABLET, ORALLY DISINTEGRATING ORAL EVERY 8 HOURS PRN
Status: DISCONTINUED | OUTPATIENT
Start: 2019-03-25 | End: 2019-03-28 | Stop reason: HOSPADM

## 2019-03-25 RX ORDER — ONDANSETRON 2 MG/ML
INJECTION INTRAMUSCULAR; INTRAVENOUS
Status: DISCONTINUED | OUTPATIENT
Start: 2019-03-25 | End: 2019-03-25

## 2019-03-25 RX ORDER — HYDROCODONE BITARTRATE AND ACETAMINOPHEN 10; 325 MG/1; MG/1
1 TABLET ORAL EVERY 4 HOURS PRN
Status: DISCONTINUED | OUTPATIENT
Start: 2019-03-26 | End: 2019-03-28 | Stop reason: HOSPADM

## 2019-03-25 RX ORDER — HYDROCODONE BITARTRATE AND ACETAMINOPHEN 5; 325 MG/1; MG/1
1 TABLET ORAL EVERY 4 HOURS PRN
Status: DISCONTINUED | OUTPATIENT
Start: 2019-03-26 | End: 2019-03-28 | Stop reason: HOSPADM

## 2019-03-25 RX ORDER — CEFAZOLIN SODIUM 1 G/3ML
INJECTION, POWDER, FOR SOLUTION INTRAMUSCULAR; INTRAVENOUS
Status: DISCONTINUED | OUTPATIENT
Start: 2019-03-25 | End: 2019-03-25

## 2019-03-25 RX ORDER — SODIUM CHLORIDE, SODIUM LACTATE, POTASSIUM CHLORIDE, CALCIUM CHLORIDE 600; 310; 30; 20 MG/100ML; MG/100ML; MG/100ML; MG/100ML
INJECTION, SOLUTION INTRAVENOUS CONTINUOUS
Status: DISCONTINUED | OUTPATIENT
Start: 2019-03-25 | End: 2019-03-25

## 2019-03-25 RX ORDER — OXYTOCIN/RINGER'S LACTATE 20/1000 ML
41.65 PLASTIC BAG, INJECTION (ML) INTRAVENOUS CONTINUOUS
Status: ACTIVE | OUTPATIENT
Start: 2019-03-25 | End: 2019-03-25

## 2019-03-25 RX ORDER — MUPIROCIN 20 MG/G
1 OINTMENT TOPICAL 2 TIMES DAILY
Status: DISCONTINUED | OUTPATIENT
Start: 2019-03-25 | End: 2019-03-28 | Stop reason: HOSPADM

## 2019-03-25 RX ORDER — AMLODIPINE BESYLATE 5 MG/1
5 TABLET ORAL DAILY
Status: DISCONTINUED | OUTPATIENT
Start: 2019-03-25 | End: 2019-03-28 | Stop reason: HOSPADM

## 2019-03-25 RX ORDER — MUPIROCIN 20 MG/G
OINTMENT TOPICAL
Status: CANCELLED | OUTPATIENT
Start: 2019-03-25

## 2019-03-25 RX ORDER — MISOPROSTOL 200 UG/1
800 TABLET ORAL
Status: DISCONTINUED | OUTPATIENT
Start: 2019-03-25 | End: 2019-03-25

## 2019-03-25 RX ORDER — BISACODYL 10 MG
10 SUPPOSITORY, RECTAL RECTAL ONCE AS NEEDED
Status: DISCONTINUED | OUTPATIENT
Start: 2019-03-25 | End: 2019-03-28 | Stop reason: HOSPADM

## 2019-03-25 RX ADMIN — ACETAMINOPHEN 1000 MG: 10 INJECTION, SOLUTION INTRAVENOUS at 09:03

## 2019-03-25 RX ADMIN — BUPIVACAINE HYDROCHLORIDE IN DEXTROSE 1.6 ML: 7.5 INJECTION, SOLUTION SUBARACHNOID at 09:03

## 2019-03-25 RX ADMIN — PHENYLEPHRINE HYDROCHLORIDE 200 MCG: 10 INJECTION INTRAVENOUS at 09:03

## 2019-03-25 RX ADMIN — PHENYLEPHRINE HYDROCHLORIDE 100 MCG: 10 INJECTION INTRAVENOUS at 09:03

## 2019-03-25 RX ADMIN — OXYTOCIN 20 UNITS: 10 INJECTION, SOLUTION INTRAMUSCULAR; INTRAVENOUS at 09:03

## 2019-03-25 RX ADMIN — SODIUM CITRATE AND CITRIC ACID MONOHYDRATE 30 ML: 500; 334 SOLUTION ORAL at 08:03

## 2019-03-25 RX ADMIN — Medication 0.15 MG: at 09:03

## 2019-03-25 RX ADMIN — ONDANSETRON 4 MG: 2 INJECTION INTRAMUSCULAR; INTRAVENOUS at 09:03

## 2019-03-25 RX ADMIN — OXYCODONE HYDROCHLORIDE 10 MG: 5 TABLET ORAL at 09:03

## 2019-03-25 RX ADMIN — CEFAZOLIN 2 G: 330 INJECTION, POWDER, FOR SOLUTION INTRAMUSCULAR; INTRAVENOUS at 09:03

## 2019-03-25 RX ADMIN — ACETAMINOPHEN 650 MG: 325 TABLET, FILM COATED ORAL at 04:03

## 2019-03-25 RX ADMIN — AMLODIPINE BESYLATE 5 MG: 5 TABLET ORAL at 04:03

## 2019-03-25 RX ADMIN — DOCUSATE SODIUM 200 MG: 100 CAPSULE, LIQUID FILLED ORAL at 08:03

## 2019-03-25 RX ADMIN — FENTANYL CITRATE 10 MCG: 50 INJECTION, SOLUTION INTRAMUSCULAR; INTRAVENOUS at 09:03

## 2019-03-25 RX ADMIN — PHENYLEPHRINE HYDROCHLORIDE 100 MCG: 10 INJECTION INTRAVENOUS at 10:03

## 2019-03-25 RX ADMIN — SODIUM CHLORIDE, SODIUM LACTATE, POTASSIUM CHLORIDE, AND CALCIUM CHLORIDE: 600; 310; 30; 20 INJECTION, SOLUTION INTRAVENOUS at 09:03

## 2019-03-25 RX ADMIN — ACETAMINOPHEN 650 MG: 325 TABLET, FILM COATED ORAL at 09:03

## 2019-03-25 RX ADMIN — OXYCODONE HYDROCHLORIDE 10 MG: 5 TABLET ORAL at 01:03

## 2019-03-25 NOTE — DISCHARGE INSTRUCTIONS
Breastfeeding Discharge Instructions       Feed the baby at the earliest sign of hunger or comfort  o Hands to mouth, sucking motions  o Rooting or searching for something to suck on  o Dont wait for crying - it is a sign of distress     The feedings may be 8-12 times per 24hrs and will not follow a schedule   Avoid pacifiers and bottles for the first 4 weeks   Alternate the breast you start the feeding with, or start with the breast that feels the fullest   Switch breasts when the baby takes himself off the breast or falls asleep   Keep offering breasts until the baby looks full, no longer gives hunger signs, and stays asleep when placed on his back in the crib   If the baby is sleepy and wont wake for a feeding, put the baby skin-to-skin dressed in a diaper against the mothers bare chest   Sleep near your baby   The baby should be positioned and latched on to the breast correctly  o Chest-to-chest, chin in the breast  o Babys lips are flipped outward  o Babys mouth is stretched open wide like a shout  o Babys sucking should feel like tugging to the mother  - The baby should be drinking at the breast:  o You should hear swallowing or gulping throughout the feeding  o You should see milk on the babys lips when he comes off the breast  o Your breasts should be softer when the baby is finished feeding  o The baby should look relaxed at the end of feedings  o After the 4th day and your milk is in:  o The babys poop should turn bright yellow and be loose, watery, and seedy  o The baby should have at least 3-4 poops the size of the palm of your hand per day  o The baby should have at least 5-6 wet diapers per day  o The urine should be light yellow in color  You should drink when you are thirsty and eat a healthy diet when you are    hungry.     Take naps to get the rest you need.   Take medications and/or drink alcohol only with permission of your obstetrician    or the babys pediatrician.  You can  also call the [facility],   ([phone]), Monday-Friday, 8am-5pm Central time, to get the most   up-to-date evidence-based information on the use of medications during   pregnancy and breastfeeding.      The baby should be examined by a pediatrician at 3-5 days of age.  Once your   milk comes in, the baby should be gaining at least ½ - 1oz each day and should be back to birthweight no later than 10-14 days of age.          Community Resources    [facility] Breastfeeding Warmline: [phone]   Local [facility] clinics: provide incentives and breastpumps to eligible mothers  [employer] (LLLI):  mother-to-mother support group website        www.[employer].Routehappy  Local La Leche League mother-to-mother support groups:        www."[employer]        La Leche League [facility]   Dr. Marin Santa website for latch videos and general information:        www.breastfeedinginc.ca  [facility] is a call center that provides information about the safety of taking medications while breastfeeding.  Call 1-344.799.3508, M-F, 8am-5pm, [place].  International Lactation Consultant Association provides resources for assistance:        www.ilca.org  [place] Breastfeeding Coalition provides informationand resources for parents  and the community    www.LaBreastfeedingSupport.org     [employer]y [employer] is a mom-to-mom support group:                             www.[employer].com//breastfeedng-support/  [employer] for Healthy Babies:  7-773-498-BABY(9377)  [employer]: a breastfeeding support group for women of color, [phone]

## 2019-03-25 NOTE — LACTATION NOTE
03/25/19 1600   Maternal Assessment   Breast Shape Right:;pendulous   Breast Density Right:;soft   Areola Right:;elastic   Nipples Right:;graspable;flat   Maternal Infant Feeding   Maternal Emotional State assist needed   Infant Positioning cradle   Pain with Feeding no   Latch Assistance yes   Breast Pumping   Breast Pumping other (see comments)  (hand expression encouraged)   Basic lactation education reviewed. Assisted with latch, infant sleepy, few sucks noted.  Discussed hand expression if unable to latch, mom is easily hand expressed. RN recently hand expressed. Encouraged STS. LC number on board, encouraged to call for next feeding.

## 2019-03-25 NOTE — ANESTHESIA PROCEDURE NOTES
Spinal    Diagnosis: Intrauterine Pregnancy  Patient location during procedure: OR  Start time: 3/25/2019 9:05 AM  Timeout: 3/25/2019 9:03 AM  End time: 3/25/2019 9:10 AM  Staffing  Anesthesiologist: Amna Perez MD  Other anesthesia staff: Yuki Lorenzana MD  Performed: other anesthesia staff   Preanesthetic Checklist  Completed: patient identified, site marked, surgical consent, pre-op evaluation, timeout performed, IV checked, risks and benefits discussed and monitors and equipment checked  Spinal Block  Patient position: sitting  Prep: ChloraPrep  Patient monitoring: frequent blood pressure checks and continuous pulse ox  Approach: midline  Location: L3-4  Injection technique: single shot  CSF Fluid: clear free-flowing CSF  Needle  Needle type: pencil-tip   Needle gauge: 25 G  Needle length: 3.5 in  Needle localization: anatomical landmarks  Additional Notes  First attempt with introducer needle without success; Tuohy needle used and successful

## 2019-03-25 NOTE — PLAN OF CARE
Problem: Breastfeeding  Goal: Effective Breastfeeding  Outcome: Ongoing (interventions implemented as appropriate)  Mother to breastfeed infant 8 or more times in 24hrs on infant's cue until content, frequent skin to skin, and will avoid bottles and pacifiers.  Mother is to keep infant actively feeding by keeping infant stimulated and using breast compression. Mother ensure effective nursing by hearing infant swallows and feeling nice tugs and pulls. Latch should not be painful while nursing.  Mother to record all breastfeedings, voids and stools in breastfeeding guide. Mother to call  for breastfeeding assistance or questions .  Refer breastfeeding guide for lactation education.

## 2019-03-25 NOTE — TRANSFER OF CARE
"Anesthesia Transfer of Care Note    Patient: Lore WELLINGTON Garcia    Procedure(s) Performed: Procedure(s) (LRB):   SECTION (N/A)    Patient location: PACU    Anesthesia Type: spinal    Transport from OR: Transported from OR on room air with adequate spontaneous ventilation    Post pain: adequate analgesia    Post assessment: no apparent anesthetic complications    Post vital signs: stable    Level of consciousness: awake and alert    Nausea/Vomiting: no nausea/vomiting    Complications: none    Transfer of care protocol was followed      Last vitals:   Visit Vitals  Ht 5' 5" (1.651 m)   Wt 118.8 kg (262 lb)   LMP 2018   BMI 43.60 kg/m²     "

## 2019-03-25 NOTE — ANESTHESIA PREPROCEDURE EVALUATION
Lore Garcia is a 33 y.o. female  at 37w2d. P    OB History    Para Term  AB Living   2 0   0 1 0   SAB TAB Ectopic Multiple Live Births   1       0      # Outcome Date GA Lbr Fran/2nd Weight Sex Delivery Anes PTL Lv   2 Current            1 SAB 17 20w0d  0.283 kg (10 oz) F Vag-Spont  N FD      Complications: Abruptio Placenta       Wt Readings from Last 1 Encounters:   19 0700 118.8 kg (262 lb)       BP Readings from Last 3 Encounters:   19 (!) 144/80   19 (!) 142/88   19 (!) 140/86       Patient Active Problem List   Diagnosis    History of myomectomy -  at 37-39 weeks ( at 10 am)    Prior pregnancy with fetal demise @ 20 weeks    Chronic hypertension affecting pregnancy- Norvasc, ASA 81 mg after 1st trimester    Morbid obesity with BMI of 40.0-44.9, adult    Hypothyroidism    Insulin controlled gestational diabetes mellitus (GDM) in third trimester (NPH 33 Units am, 17 Units pm; Regular 17 Units three times per day; Metformin 1000 mg q day)    S/P  section    History of  delivery       Past Surgical History:   Procedure Laterality Date    MYOMECTOMY  2018    ROBOTIC ASSISTED LAPAROSCOPIC MYOMECTOMY N/A 2018    Per[facility] at [facility] OR    THYROIDECTOMY, PARTIAL Left        Social History     Socioeconomic History    Marital status: Single     Spouse name: Not on file    Number of children: Not on file    Years of education: Not on file    Highest education level: Not on file   Occupational History    Not on file   Social Needs    Financial resource strain: Not on file    Food insecurity:     Worry: Not on file     Inability: Not on file    Transportation needs:     Medical: Not on file     Non-medical: Not on file   Tobacco Use    Smoking status: Never Smoker    Smokeless tobacco: Never Used   Substance and Sexual Activity    Alcohol use: No     Alcohol/week: 1.2 oz     Types:  2 Glasses of wine per week    Drug use: No    Sexual activity: Yes     Partners: Female   Lifestyle    Physical activity:     Days per week: Not on file     Minutes per session: Not on file    Stress: Not on file   Relationships    Social connections:     Talks on phone: Not on file     Gets together: Not on file     Attends Jain service: Not on file     Active member of club or organization: Not on file     Attends meetings of clubs or organizations: Not on file     Relationship status: Not on file    Intimate partner violence:     Fear of current or ex partner: Not on file     Emotionally abused: Not on file     Physically abused: Not on file     Forced sexual activity: Not on file   Other Topics Concern    Not on file   Social History Narrative    Not on file         Chemistry        Component Value Date/Time     02/21/2019 1255    K 4.6 02/21/2019 1255     02/21/2019 1255    CO2 18 (L) 02/21/2019 1255    BUN 4 (L) 02/21/2019 1255    CREATININE 0.6 02/21/2019 1255    GLU 91 02/21/2019 1255        Component Value Date/Time    CALCIUM 9.4 02/21/2019 1255    ALKPHOS 89 02/21/2019 1255    AST 28 02/21/2019 1255    ALT 24 02/21/2019 1255    BILITOT 0.3 02/21/2019 1255    ESTGFRAFRICA >60 02/21/2019 1255    EGFRNONAA >60 02/21/2019 1255            Lab Results   Component Value Date    WBC 8.07 03/25/2019    HGB 12.7 03/25/2019    HCT 37.6 03/25/2019    MCV 87 03/25/2019     03/25/2019       No results for input(s): PT, INR, PROTIME, APTT in the last 72 hours.                  Anesthesia Evaluation    I have reviewed the Patient Summary Reports.     I have reviewed the Medications.     Review of Systems  Anesthesia Hx:  No problems with previous Anesthesia  History of prior surgery of interest to airway management or planning: Previous anesthesia: General Denies Family Hx of Anesthesia complications.   Denies Personal Hx of Anesthesia complications.   Cardiovascular:   Exercise tolerance:  good Hypertension    Endocrine:   Diabetes Hypothyroidism        Physical Exam  General:  Well nourished    Airway/Jaw/Neck:  Airway Findings: Mouth Opening: Normal Tongue: Normal  General Airway Assessment: Adult  Mallampati: II  Improves to II with phonation.  TM Distance: Normal, at least 6 cm  Jaw/Neck Findings:  Neck ROM: Normal ROM      Dental:  Dental Findings: In tact   Chest/Lungs:  Chest/Lungs Findings: Clear to auscultation, Normal Respiratory Rate     Heart/Vascular:  Heart Findings: Rate: Normal  Rhythm: Regular Rhythm  Sounds: Normal        Mental Status:  Mental Status Findings:  Cooperative, Alert and Oriented         Anesthesia Plan  Type of Anesthesia, risks & benefits discussed:  Anesthesia Type:  general, epidural, CSE, spinal  Patient's Preference:   Intra-op Monitoring Plan: standard ASA monitors  Intra-op Monitoring Plan Comments:   Post Op Pain Control Plan: multimodal analgesia  Post Op Pain Control Plan Comments:   Induction:   IV  Beta Blocker:         Informed Consent: Patient understands risks and agrees with Anesthesia plan.  Questions answered. Anesthesia consent signed with patient.  ASA Score: 2     Day of Surgery Review of History & Physical:    H&P update referred to the surgeon.         Ready For Surgery From Anesthesia Perspective.

## 2019-03-25 NOTE — H&P
"History and Physical                                            Obstetrics          Subjective:       Lore Garcia is a 33 y.o.  female with IUP at 37w2d weeks gestation who presents for scheduled  section due tohistory of myomectomy.    This IUP is complicated by history of uterine surgery, CHTN (Norvasc), GDM (insulin).  Patient denies contractions, denies vaginal bleeding, denies LOF.   Fetal Movement: normal.     PMHx:   Past Medical History:   Diagnosis Date    Diabetes mellitus     Hypertension     Thyroid disease        PSHx:   Past Surgical History:   Procedure Laterality Date    MYOMECTOMY  2018    ROBOTIC ASSISTED LAPAROSCOPIC MYOMECTOMY N/A 2018    [facility]    THYROIDECTOMY, PARTIAL Left        All:   Review of patient's allergies indicates:   Allergen Reactions    Apple Swelling     Throat swelling  Itching in throat    Cherry Itching and Swelling     Throat swelling  Itching in throat    Ibuprofen Swelling     Face swelling       Meds:   Medications Prior to Admission   Medication Sig Dispense Refill Last Dose    amLODIPine (NORVASC) 5 MG tablet Take 1 tablet (5 mg total) by mouth once daily. 30 tablet 11 Taking    BD ULTRA-FINE DUSTIN PEN NEEDLE 32 gauge x 5/32" Ndle    Taking    BD VEO INSULIN SYRINGE UF 1 mL 31 gauge x 15/64" Syrg USE TWICE A DAY FOR HUMULIN N  0     blood sugar diagnostic Strp 1 strip by Misc.(Non-Drug; Combo Route) route 4 (four) times daily. 200 each 6 Taking    glucagon, human recombinant, (GLUCAGON EMERGENCY KIT, HUMAN,) 1 mg SolR Inject 1 mg into the muscle as needed (for hypoglcyemia). 2 each 0 Taking    HUMULIN N NPH U-100 INSULIN 100 unit/mL injection INJECT 33 UNITS DAILY BEFORE BREAKFAST AND 17 UNITS DAILY BEFORE DINNER  1     insulin NPH isoph U-100 human 100 unit/mL (3 mL) InPn Inject 17 Units into the skin before dinner. 3 mL 1 Taking    insulin NPH isoph U-100 human 100 unit/mL (3 mL) " "InPn Inject 33 Units into the skin before breakfast. 3 mL 2 Taking    insulin regular 100 unit/mL Inj injection Inject 17 Units into the skin 3 (three) times daily. Before breakfast, before lunch, before dinner 10 mL 3 Taking    insulin syringes, disposable, 1 mL Syrg 1 Units by Misc.(Non-Drug; Combo Route) route 3 (three) times daily. 100 each 1 Taking    lancets (ONETOUCH ULTRASOFT LANCETS) Misc 1 Units by Misc.(Non-Drug; Combo Route) route 4 (four) times daily. 200 each 6 Taking    metFORMIN (GLUCOPHAGE) 500 MG tablet Take 1 tablet (500 mg total) by mouth daily with breakfast. 30 tablet 11 Taking    ONETOUCH ULTRA2 kit USE TO TEST BLOOD SUGARS  0 Taking    pen needle, diabetic 33 gauge x 5/32" Ndle 1 each by Misc.(Non-Drug; Combo Route) route 3 (three) times daily. 100 each 1 Taking    prenatal vit calc,iron,folic (PRENATAL VITAMIN ORAL) Take by mouth.   Taking       SH:   Social History     Socioeconomic History    Marital status: Single     Spouse name: Not on file    Number of children: Not on file    Years of education: Not on file    Highest education level: Not on file   Occupational History    Not on file   Social Needs    Financial resource strain: Not on file    Food insecurity:     Worry: Not on file     Inability: Not on file    Transportation needs:     Medical: Not on file     Non-medical: Not on file   Tobacco Use    Smoking status: Never Smoker    Smokeless tobacco: Never Used   Substance and Sexual Activity    Alcohol use: No     Alcohol/week: 1.2 oz     Types: 2 Glasses of wine per week    Drug use: No    Sexual activity: Yes     Partners: Female   Lifestyle    Physical activity:     Days per week: Not on file     Minutes per session: Not on file    Stress: Not on file   Relationships    Social connections:     Talks on phone: Not on file     Gets together: Not on file     Attends Rastafarian service: Not on file     Active member of club or organization: Not on file     " "Attends meetings of clubs or organizations: Not on file     Relationship status: Not on file    Intimate partner violence:     Fear of current or ex partner: Not on file     Emotionally abused: Not on file     Physically abused: Not on file     Forced sexual activity: Not on file   Other Topics Concern    Not on file   Social History Narrative    Not on file       FH:   Family History   Problem Relation Age of Onset    Breast cancer Neg Hx     Colon cancer Neg Hx     Ovarian cancer Neg Hx        OBHx:   OB History    Para Term  AB Living   2 0 0 0 1 0   SAB TAB Ectopic Multiple Live Births   1 0 0 0 0      # Outcome Date GA Lbr Fran/2nd Weight Sex Delivery Anes PTL Lv   2 Current            1 SAB 17 20w0d  0.283 kg (10 oz) F Vag-Spont  N FD      Complications: Abruptio Placenta         Review of Systems:  Constitutional: no fever or chills  Respiratory: no cough or shortness of breath  Cardiovascular: no chest pain or palpitations  Gastrointestinal: no nausea or vomiting, tolerating diet  Genitourinary: no hematuria or dysuria    Objective:       Ht 5' 5" (1.651 m)   Wt 118.8 kg (262 lb)   LMP 2018   BMI 43.60 kg/m²          General:   alert, appears stated age and cooperative   Lungs:   normal respiratory effort   Heart:   regular rate and rhythm   Abdomen:  soft, nontender, gravid   Extremities negative edema, negative erythema     Fetal Heart Tones:  NST: reassuring, Category 1, 130 bpm, moderate BTBV, (+) accelerations, (--) decelerations  TOCO: irritability      Ultrasound:  cephalic    EFW by Leopold's: 8    Lab Review  Blood Type B POS  GBBS: negative  Rubella: Immune  RPR: NR  HIV: negative  HepB: negative    Lab Results   Component Value Date    WBC 6.36 2019    HGB 13.2 2019    HCT 40.1 2019    MCV 87 2019     2019          Assessment:       37w2d weeks gestation admitted for History of myomectomy    Active Hospital Problems    " Diagnosis  POA    *History of myomectomy -  at 37-39 weeks ( at 10 am) [Z98.890]  Not Applicable     Chronic      Resolved Hospital Problems   No resolved problems to display.          Plan:          1. History of myomectomy  - Admit to Labor and Delivery unit  - Consents for delivery including  section and blood transfusion signed and to chart  - Risks, benefits, alternatives and possible complications have been discussed in detail with the patient.   - Spinal per Anesthesia    Post-Partum Hemorrhage risk - medium    2. CHTN  - Norvasc 5 to be continued postop    3. GDM on insulin  - will likely dc meds postpartum    4. Hypothyroid  - continue home meds postpartum    Consuelo Jennings MD

## 2019-03-25 NOTE — L&D DELIVERY NOTE
Section Operative Note  Procedure Date: 2019    Procedure: Primary  Section via pfannenstiel skin incision    Indications:   1. History of myomectomy  2. Obesity  3. Gestational diabetes  4. Chronic hypertension    Pre-operative Diagnosis: IUP at 37 week 0 day pregnancy    Post-operative Diagnosis: same    Surgeon: Dr. Elicia Ruiz MD    Assistants: Felicita Zapata MD PGY-2    Anesthesia: Spinal anesthesia    Findings:   1. Grossly normal uterus, tubes, and ovaries. Some filmy adhesions noted on the uterus.   2. Infant in vertex position  3. Hemostasis noted at the end of the procedure.     Estimated Blood Loss:  355           Total IV Fluids: 1500 mL     UOP: 250 mL    Specimens: single viable female infant , Placenta which was discarded    PreOp CBC:   Lab Results   Component Value Date    WBC 8.07 2019    HGB 12.7 2019    HCT 37.6 2019    MCV 87 2019     2019                     Complications:  None; patient tolerated the procedure well.           Disposition: PACU - hemodynamically stable.           Condition: stable    Procedure Details   The patient was seen in the Holding Room. The risks, benefits, complications, treatment options, and expected outcomes were discussed with the patient.  The patient concurred with the proposed plan, giving informed consent.  The site of surgery properly noted/marked. The [facility] and the procedure verified as Primary  Delivery. A Time Out was held and the above information confirmed.    After induction of anesthesia, the patient was prepped and draped in the usual sterile manner while placed in a dorsal supine position with a left lateral tilt.  A jay catheter was also placed per nursing. Preoperative antibiotics ancef were administered and an allis test was performed yielding adequate anesthesia.  A Pfannenstiel incision was made and carried down  through the subcutaneous tissue to the fascia. Fascial incision was made and extended transversely. The fascia was grasped with Kocher clamps and  from the underlying rectus tissue superiorly and inferiorly. The peritoneum was identified, found to be free of adherent bowl and entered bluntly. Peritoneal incision was extended longitudinally. The vesico-uterine peritoneum was identified and bladder blade was inserted.  A low transverse uterine incision was made with knife and extended manually. The amniotic sac was ruptured with and the infant was noted to be in vertex position. The head was brought to the incision and elevated out of the pelvis. The patient delivered a single viable female infant without difficulty. After the umbilical cord was clamped and cut cord blood was obtained for evaluation. The placenta was removed intact and appeared normal and was discarded. The uterus was exteriorized. The uterine outline, tubes and ovaries appeared normal. The uterine incision was closed with running locked sutures of 1-0 chromic. Hemostasis was observed. The uterus was returned to the abdominal cavity. Incision was reinspected and good hemostasis was noted. The abdominal cavity was irrigated to remove clots. The peritoneum and muscle was reapproximated with 2-0 vicryl and 1-0 chromic. The fascia was then reapproximated with running sutures of 0 PDS. The subcutaneous fat and skin was reapproximated with 1-0 Vicryl and 4-0 monocryl respectively.    Instrument, sponge, and needle counts were correct prior the abdominal closure and at the conclusion of the case.     Pt tolerated procedure well and Dr. Ruiz discussed with family that pt was stable and in good condition after the procedure.    Attending statement    I was present and scrubbed for the entire surgical procedure    Elicia Ruiz MD, FACOG  Obstetrics and Gynecology          Condition: Good    Disposition: PACU - hemodynamically  stable.    Attestation: Good         Delivery Information for  Girl [facility]    Birth information:  YOB: 2019   Time of birth: 9:24 AM   Sex: female   Head Delivery Date/Time: 3/25/2019  9:24 AM   Delivery type: , Low Transverse   Gestational Age: 37w2d    Delivery Providers    Delivering clinician:  Elicia Ruiz MD   Provider Role    Jacqueline Yip RN Registered Nurse    Mayelin Campbell, RN Registered Nurse    Consuelo Kumari Surgical Tech            Measurements    Weight:  3402 g  Length:  50.8 cm  Head circumference:  35.6 cm  Chest circumference:  34.9 cm         Apgars    Living status:  Living  Apgars:   1 min.:   5 min.:   10 min.:   15 min.:   20 min.:     Skin color:   0  1       Heart rate:   2  2       Reflex irritability:   2  2       Muscle tone:   2  2       Respiratory effort:   2  2       Total:   8  9       Apgars assigned by:  ANGEL VALENTINE         Operative Delivery    Forceps attempted?:  No  Vacuum extractor attempted?:  No         Shoulder Dystocia    Shoulder dystocia present?:  No           Presentation    Presentation:  Vertex  Position:  Middle Occiput Anterior           Interventions/Resuscitation    Method:  Tactile Stimulation       Cord    Vessels:  3 vessels  Complications:  None  Delayed Cord Clamping?:  No  Cord Clamped Date/Time:  3/25/2019  9:24 AM  Cord Blood Disposition:  Sent with Baby  Gases Sent?:  No  Stem Cell Collection (by MD):  No       Placenta    Placenta delivery date/time:  3/25/2019 09  Placenta removal:  Manual removal  Placenta appearance:  Intact  Placenta disposition:  discarded           Labor Events:       labor: No     Labor Onset Date/Time:         Dilation Complete Date/Time:         Start Pushing Date/Time:       Rupture Date/Time:              Rupture type:           Fluid Amount:        Fluid Color:        Fluid Odor:        Membrane Status (PeriCalm):        Rupture Date/Time (PeriCalm):        Fluid  Amount (PeriCalm):        Fluid Color (PeriCalm):         steroids: None     Antibiotics given for GBS: No     Induction: none     Indications for induction:        Augmentation:       Indications for augmentation:       Labor complications: None     Additional complications:          Cervical ripening:                     Delivery:      Episiotomy: None     Indication for Episiotomy:       Perineal Lacerations: None Repaired:      Periurethral Laceration:   Repaired:     Labial Laceration:   Repaired:     Sulcus Laceration:   Repaired:     Vaginal Laceration:   Repaired:     Cervical Laceration:   Repaired:     Repair suture: None     Repair # of packets: 8     Last Value - EBL - Nursing (mL): 0     Sum - EBL - Nursing (mL): 480     Last Value - EBL - Anesthesia (mL): 480      Calculated QBL (mL): 355      Vaginal Sweep Performed: No     Surgicount Correct: No       Other providers:       Anesthesia    Method:  Spinal, Epidural          Details (if applicable):  Trial of Labor No    Categorization: Primary    Priority: Routine   Indications for : Other (Add Comments)   Incision Type: low transverse     Additional  information:  Forceps:    Vacuum:    Breech:    Observed anomalies    Other (Comments):

## 2019-03-26 LAB
BASOPHILS # BLD AUTO: 0.01 K/UL (ref 0–0.2)
BASOPHILS NFR BLD: 0.1 % (ref 0–1.9)
DIFFERENTIAL METHOD: ABNORMAL
EOSINOPHIL # BLD AUTO: 0.1 K/UL (ref 0–0.5)
EOSINOPHIL NFR BLD: 1.3 % (ref 0–8)
ERYTHROCYTE [DISTWIDTH] IN BLOOD BY AUTOMATED COUNT: 14.4 % (ref 11.5–14.5)
GLUCOSE SERPL-MCNC: 106 MG/DL (ref 70–110)
HCT VFR BLD AUTO: 34.8 % (ref 37–48.5)
HGB BLD-MCNC: 11.4 G/DL (ref 12–16)
LYMPHOCYTES # BLD AUTO: 1.8 K/UL (ref 1–4.8)
LYMPHOCYTES NFR BLD: 25.3 % (ref 18–48)
MCH RBC QN AUTO: 28.7 PG (ref 27–31)
MCHC RBC AUTO-ENTMCNC: 32.8 G/DL (ref 32–36)
MCV RBC AUTO: 88 FL (ref 82–98)
MONOCYTES # BLD AUTO: 0.7 K/UL (ref 0.3–1)
MONOCYTES NFR BLD: 9.4 % (ref 4–15)
NEUTROPHILS # BLD AUTO: 4.5 K/UL (ref 1.8–7.7)
NEUTROPHILS NFR BLD: 63.8 % (ref 38–73)
PLATELET # BLD AUTO: 228 K/UL (ref 150–350)
PMV BLD AUTO: 9.2 FL (ref 9.2–12.9)
RBC # BLD AUTO: 3.97 M/UL (ref 4–5.4)
WBC # BLD AUTO: 7.03 K/UL (ref 3.9–12.7)

## 2019-03-26 PROCEDURE — 82947 ASSAY GLUCOSE BLOOD QUANT: CPT

## 2019-03-26 PROCEDURE — 99223 1ST HOSP IP/OBS HIGH 75: CPT | Mod: ,,, | Performed by: OBSTETRICS & GYNECOLOGY

## 2019-03-26 PROCEDURE — 25000003 PHARM REV CODE 250: Performed by: STUDENT IN AN ORGANIZED HEALTH CARE EDUCATION/TRAINING PROGRAM

## 2019-03-26 PROCEDURE — 36415 COLL VENOUS BLD VENIPUNCTURE: CPT

## 2019-03-26 PROCEDURE — 99223 PR INITIAL HOSPITAL CARE,LEVL III: ICD-10-PCS | Mod: ,,, | Performed by: OBSTETRICS & GYNECOLOGY

## 2019-03-26 PROCEDURE — 85025 COMPLETE CBC W/AUTO DIFF WBC: CPT

## 2019-03-26 PROCEDURE — 11000001 HC ACUTE MED/SURG PRIVATE ROOM

## 2019-03-26 RX ORDER — ACETAMINOPHEN 325 MG/1
650 TABLET ORAL EVERY 6 HOURS
Status: DISCONTINUED | OUTPATIENT
Start: 2019-03-26 | End: 2019-03-26

## 2019-03-26 RX ORDER — ACETAMINOPHEN 325 MG/1
650 TABLET ORAL EVERY 6 HOURS
Status: DISCONTINUED | OUTPATIENT
Start: 2019-03-26 | End: 2019-03-28 | Stop reason: HOSPADM

## 2019-03-26 RX ADMIN — ACETAMINOPHEN 650 MG: 325 TABLET, FILM COATED ORAL at 03:03

## 2019-03-26 RX ADMIN — AMLODIPINE BESYLATE 5 MG: 5 TABLET ORAL at 09:03

## 2019-03-26 RX ADMIN — DOCUSATE SODIUM 200 MG: 100 CAPSULE, LIQUID FILLED ORAL at 09:03

## 2019-03-26 RX ADMIN — HYDROCODONE BITARTRATE AND ACETAMINOPHEN 1 TABLET: 10; 325 TABLET ORAL at 04:03

## 2019-03-26 RX ADMIN — HYDROCODONE BITARTRATE AND ACETAMINOPHEN 1 TABLET: 10; 325 TABLET ORAL at 12:03

## 2019-03-26 RX ADMIN — OXYCODONE HYDROCHLORIDE 10 MG: 5 TABLET ORAL at 08:03

## 2019-03-26 RX ADMIN — OXYCODONE HYDROCHLORIDE 10 MG: 5 TABLET ORAL at 04:03

## 2019-03-26 RX ADMIN — ACETAMINOPHEN 650 MG: 325 TABLET, FILM COATED ORAL at 09:03

## 2019-03-26 RX ADMIN — HYDROCODONE BITARTRATE AND ACETAMINOPHEN 1 TABLET: 10; 325 TABLET ORAL at 09:03

## 2019-03-26 NOTE — LACTATION NOTE
03/26/19 1230   Maternal Assessment   Breast Shape Left:;pendulous   Breast Density Left:;soft   Areola Left:;elastic   Nipples Left:;graspable;flat   Maternal Infant Feeding   Maternal Emotional State assist needed;relaxed   Infant Positioning clutch/football   Signs of Milk Transfer audible swallow;infant jaw motion present   Pain with Feeding no   Nipple Shape After Feeding, Left round   Latch Assistance yes   Breast Pumping   Breast Pumping other (see comments)  (hand expression encouraged)   Basic lactation education reviewed. Assisted with position and latch, few attempts to latch infant deeply; showed mom how to pull and tug flat nipple out to jillian for baby. Infant nurses well with some compression/ stimulation. Audible swallows. Breast shells given to keep mother's nipple everted during engorgement period, discussed use and care, left at bedside.  number on board.

## 2019-03-26 NOTE — ANESTHESIA POSTPROCEDURE EVALUATION
Anesthesia Post Evaluation    Patient: Lore WELLINGTON Garcia    Procedure(s) Performed: Procedure(s) (LRB):   SECTION (N/A)    Final Anesthesia Type: spinal  Patient location during evaluation: floor  Patient participation: Yes- Able to Participate  Level of consciousness: awake and alert  Post-procedure vital signs: reviewed and stable  Pain management: adequate  Airway patency: patent  PONV status at discharge: No PONV  Anesthetic complications: no      Cardiovascular status: blood pressure returned to baseline  Respiratory status: unassisted, spontaneous ventilation and room air  Hydration status: euvolemic  Follow-up not needed.          Vitals Value Taken Time   /77 3/26/2019  6:57 AM   Temp 36.8 °C (98.2 °F) 3/26/2019  6:57 AM   Pulse 80 3/26/2019  6:57 AM   Resp 18 3/26/2019  6:57 AM   SpO2 97 % 3/26/2019  6:57 AM         Event Time     Out of Recovery 15:00:00          Pain/Jennifer Score: Pain Rating Prior to Med Admin: 7 (3/26/2019 12:00 PM)  Pain Rating Post Med Admin: 5 (3/26/2019 10:40 AM)

## 2019-03-26 NOTE — PROGRESS NOTES
POSTPARTUM PROGRESS NOTE     Lore Garcia is a 33 y.o. female POD #1 status post   section at 37w2d in a pregnancy complicated by chronic HTN on norvasc, GDM, obesity, and history of myomyectomy. Patient is doing well this morning. She denies nausea, vomiting, fever or chills.  Patient reports moderate abdominal pain that is adequately relieved by oral pain medications. Lochia is mild to moderate  and stable. Patient is voiding without difficulty and ambulating with no difficulty. She has passed flatus, and has not had BM.  Patient does plan to breast feed. Contraception per Dr. Ruiz.    Objective:       Temp:  [97 °F (36.1 °C)-98.2 °F (36.8 °C)] 98.2 °F (36.8 °C)  Pulse:  [] 97  Resp:  [18-20] 18  SpO2:  [92 %-100 %] 100 %  BP: ()/(46-84) 127/74    General:   alert, appears stated age and cooperative   Lungs:   normal respiratory effort   Heart:   regular rate and rhythm   Abdomen:  soft, non-tender; bowel sounds normal; no masses,  no organomegaly   Uterus:  firm located at the umblicus.        Incision: Bandage in place, clean, dry and intact   Extremities: no edema, redness or tenderness in the calves or thighs     Lab Review  Recent Results (from the past 4 hour(s))   CBC auto differential    Collection Time: 19  4:59 AM   Result Value Ref Range    WBC 7.03 3.90 - 12.70 K/uL    RBC 3.97 (L) 4.00 - 5.40 M/uL    Hemoglobin 11.4 (L) 12.0 - 16.0 g/dL    Hematocrit 34.8 (L) 37.0 - 48.5 %    MCV 88 82 - 98 fL    MCH 28.7 27.0 - 31.0 pg    MCHC 32.8 32.0 - 36.0 g/dL    RDW 14.4 11.5 - 14.5 %    Platelets 228 150 - 350 K/uL    MPV 9.2 9.2 - 12.9 fL    Gran # (ANC) 4.5 1.8 - 7.7 K/uL    Lymph # 1.8 1.0 - 4.8 K/uL    Mono # 0.7 0.3 - 1.0 K/uL    Eos # 0.1 0.0 - 0.5 K/uL    Baso # 0.01 0.00 - 0.20 K/uL    Gran% 63.8 38.0 - 73.0 %    Lymph% 25.3 18.0 - 48.0 %    Mono% 9.4 4.0 - 15.0 %    Eosinophil% 1.3 0.0 - 8.0 %    Basophil% 0.1 0.0 - 1.9 %    Differential Method Automated    Glucose,  fasting    Collection Time: 19  4:59 AM   Result Value Ref Range    Glucose, Fasting 106 70 - 110 mg/dL       I/O    Intake/Output Summary (Last 24 hours) at 3/26/2019 0640  Last data filed at 3/26/2019 0400  Gross per 24 hour   Intake 600 ml   Output 5735 ml   Net -5135 ml        Assessment:     Patient Active Problem List   Diagnosis    History of myomectomy -  at 37-39 weeks ( at 10 am)    Prior pregnancy with fetal demise @ 20 weeks    Chronic hypertension affecting pregnancy- Norvasc, ASA 81 mg after 1st trimester    Morbid obesity with BMI of 40.0-44.9, adult    Hypothyroidism    Insulin controlled gestational diabetes mellitus (GDM) in third trimester (NPH 33 Units am, 17 Units pm; Regular 17 Units three times per day; Metformin 1000 mg q day)    S/P  section    History of  delivery        Plan:   1. Postpartum care:  - Patient doing well. Continue routine management and advances.  - Continue PO pain meds. Pain well controlled.  - Heme: H/h  >   - Encourage ambulation  - Contraception per Dr. Ruiz  - Lactation prn    2. Chronic HTN  -BP: ()/(46-84) 127/74  - Asymptomatic  - Continue home norvasc    3. GDM  - Fasting blood glucose 106    4. Obesity  - BMI 43  - Encourage ambulation      Dispo: As patient meets milestones, will plan to discharge POD#3-4.    Felicita Zapata M.D.  OBGYN  PGY2      BP and Glucose nted  Doing well, no questions,no problems.  I have reviewed the resident's note, evaluated the patient and agree with the diagnosis and management plan

## 2019-03-27 PROCEDURE — 11000001 HC ACUTE MED/SURG PRIVATE ROOM

## 2019-03-27 PROCEDURE — 25000003 PHARM REV CODE 250: Performed by: STUDENT IN AN ORGANIZED HEALTH CARE EDUCATION/TRAINING PROGRAM

## 2019-03-27 RX ORDER — HYDROCODONE BITARTRATE AND ACETAMINOPHEN 5; 325 MG/1; MG/1
1 TABLET ORAL EVERY 4 HOURS PRN
Qty: 20 TABLET | Refills: 0 | Status: SHIPPED | OUTPATIENT
Start: 2019-03-27 | End: 2019-05-30

## 2019-03-27 RX ADMIN — HYDROCODONE BITARTRATE AND ACETAMINOPHEN 1 TABLET: 10; 325 TABLET ORAL at 09:03

## 2019-03-27 RX ADMIN — HYDROCODONE BITARTRATE AND ACETAMINOPHEN 1 TABLET: 10; 325 TABLET ORAL at 08:03

## 2019-03-27 RX ADMIN — HYDROCODONE BITARTRATE AND ACETAMINOPHEN 1 TABLET: 10; 325 TABLET ORAL at 12:03

## 2019-03-27 RX ADMIN — DOCUSATE SODIUM 200 MG: 100 CAPSULE, LIQUID FILLED ORAL at 08:03

## 2019-03-27 RX ADMIN — HYDROCODONE BITARTRATE AND ACETAMINOPHEN 1 TABLET: 10; 325 TABLET ORAL at 04:03

## 2019-03-27 RX ADMIN — AMLODIPINE BESYLATE 5 MG: 5 TABLET ORAL at 08:03

## 2019-03-27 NOTE — DISCHARGE SUMMARY
Delivery Discharge Summary  Obstetrics      Primary OB Clinician: Elicia Ruiz MD      Admission date: 3/25/2019  Discharge date: 2019    Disposition: To home, self care    Discharge Diagnosis List:      Patient Active Problem List   Diagnosis    History of myomectomy -  at 37-39 weeks ( at 10 am)    Prior pregnancy with fetal demise @ 20 weeks    Chronic hypertension affecting pregnancy- Norvasc, ASA 81 mg after 1st trimester    Morbid obesity with BMI of 40.0-44.9, adult    Hypothyroidism    Insulin controlled gestational diabetes mellitus (GDM) in third trimester (NPH 33 Units am, 17 Units pm; Regular 17 Units three times per day; Metformin 1000 mg q day)    S/P  section    History of  delivery       Procedure: , due to prior uterine surgery    Hospital Course:  Lore Garcia is a 33 y.o. now , POD #3 who was admitted on 3/25/2019 at 37w2d for scheduled  section. Patient was subsequently admitted to labor and delivery unit with signed consents.     Please see delivery note for further details. Her postpartum course was uncomplicated. On discharge day, patient's pain is controlled with oral pain medications. Pt is tolerating ambulation without SOB or CP, and regular diet without N/V. Reports lochia is mild. Denies any HA, vision changes, F/C, LE swelling. Denies any breast pain/soreness.    Pt in stable condition and ready for discharge. She has been instructed to start and/or continue medications and follow up with her obstetrics provider as listed below.    Pertinent studies:  CBC  Recent Labs   Lab 19  1439 19  0824 19  0459   WBC 6.36 8.07 7.03   HGB 13.2 12.7 11.4*   HCT 40.1 37.6 34.8*   MCV 87 87 88    286 228          Immunization History   Administered Date(s) Administered    Influenza 10/02/2018    Tdap 2019        Delivery:    Episiotomy: None   Lacerations: None   Repair suture: None  "  Repair # of packets: 8   Blood loss (ml): 0     Birth information:  YOB: 2019   Time of birth: 9:24 AM   Sex: female   Delivery type: , Low Transverse   Gestational Age: 37w2d    Delivery Clinician:      Other providers:       Additional  information:  Forceps:    Vacuum:    Breech:    Observed anomalies      Living?:           APGARS  One minute Five minutes Ten minutes   Skin color:         Heart rate:         Grimace:         Muscle tone:         Breathing:         Totals: 8  9        Placenta: Delivered:       appearance      Patient Instructions:   Current Discharge Medication List      START taking these medications    Details   HYDROcodone-acetaminophen (NORCO) 5-325 mg per tablet Take 1 tablet by mouth every 4 (four) hours as needed.  Qty: 20 tablet, Refills: 0         CONTINUE these medications which have NOT CHANGED    Details   amLODIPine (NORVASC) 5 MG tablet Take 1 tablet (5 mg total) by mouth once daily.  Qty: 30 tablet, Refills: 11    Associated Diagnoses: Chronic hypertension      BD ULTRA-FINE DUSTIN PEN NEEDLE 32 gauge x 5/32" Ndle       BD VEO INSULIN SYRINGE UF 1 mL 31 gauge x 15/64" Syrg USE TWICE A DAY FOR HUMULIN N  Refills: 0      blood sugar diagnostic Strp 1 strip by Misc.(Non-Drug; Combo Route) route 4 (four) times daily.  Qty: 200 each, Refills: 6    Associated Diagnoses: Diet controlled gestational diabetes mellitus (GDM) in third trimester      glucagon, human recombinant, (GLUCAGON EMERGENCY KIT, HUMAN,) 1 mg SolR Inject 1 mg into the muscle as needed (for hypoglcyemia).  Qty: 2 each, Refills: 0    Associated Diagnoses: Insulin controlled gestational diabetes mellitus (GDM) in third trimester      HUMULIN N NPH U-100 INSULIN 100 unit/mL injection INJECT 33 UNITS DAILY BEFORE BREAKFAST AND 17 UNITS DAILY BEFORE DINNER  Refills: 1      !! insulin NPH isoph U-100 human 100 unit/mL (3 mL) InPn Inject 17 Units into the skin before dinner.  Qty: 3 mL, Refills: 1    " "Associated Diagnoses: Insulin controlled gestational diabetes mellitus (GDM) in third trimester      !! insulin NPH isoph U-100 human 100 unit/mL (3 mL) InPn Inject 33 Units into the skin before breakfast.  Qty: 3 mL, Refills: 2    Associated Diagnoses: Insulin controlled gestational diabetes mellitus (GDM) in third trimester      insulin regular 100 unit/mL Inj injection Inject 17 Units into the skin 3 (three) times daily. Before breakfast, before lunch, before dinner  Qty: 10 mL, Refills: 3    Associated Diagnoses: Insulin controlled gestational diabetes mellitus (GDM) in third trimester      insulin syringes, disposable, 1 mL Syrg 1 Units by Misc.(Non-Drug; Combo Route) route 3 (three) times daily.  Qty: 100 each, Refills: 1    Associated Diagnoses: Insulin controlled gestational diabetes mellitus (GDM) in third trimester      lancets (ONETOUCH ULTRASOFT LANCETS) Misc 1 Units by Misc.(Non-Drug; Combo Route) route 4 (four) times daily.  Qty: 200 each, Refills: 6    Associated Diagnoses: Diet controlled gestational diabetes mellitus (GDM) in third trimester      metFORMIN (GLUCOPHAGE) 500 MG tablet Take 1 tablet (500 mg total) by mouth daily with breakfast.  Qty: 30 tablet, Refills: 11    Associated Diagnoses: Gestational diabetes mellitus (GDM) in third trimester controlled on oral hypoglycemic drug      ONETOUCH ULTRA2 kit USE TO TEST BLOOD SUGARS  Refills: 0      pen needle, diabetic 33 gauge x 5/32" Ndle 1 each by Misc.(Non-Drug; Combo Route) route 3 (three) times daily.  Qty: 100 each, Refills: 1    Associated Diagnoses: Insulin controlled gestational diabetes mellitus (GDM) in third trimester      prenatal vit calc,iron,folic (PRENATAL VITAMIN ORAL) Take by mouth.       !! - Potential duplicate medications found. Please discuss with provider.          Discharge Procedure Orders   Diet Adult Regular     Other restrictions (specify):   Order Comments: Pelvic rest for at least 6 weeks. Nothing in vagina - no " sex, tampons, or douching for 6 weeks     Notify your health care provider if you experience any of the following:   Order Comments: Heavy vaginal bleeding saturating more than 1 pad per hour for at least 2 hours.     Notify your health care provider if you experience any of the following:  increased confusion or weakness     Notify your health care provider if you experience any of the following:  persistent dizziness, light-headedness, or visual disturbances     Notify your health care provider if you experience any of the following:  worsening rash     Notify your health care provider if you experience any of the following:  severe persistent headache     Notify your health care provider if you experience any of the following:  difficulty breathing or increased cough     Notify your health care provider if you experience any of the following:  redness, tenderness, or signs of infection (pain, swelling, redness, odor or green/yellow discharge around incision site)     Notify your health care provider if you experience any of the following:  severe uncontrolled pain     Notify your health care provider if you experience any of the following:  persistent nausea and vomiting or diarrhea     Notify your health care provider if you experience any of the following:  temperature >100.4     Activity as tolerated       Follow-up Information     Elicia Ruiz MD. Schedule an appointment as soon as possible for a visit in 6 weeks.    Specialties:  Obstetrics, Obstetrics and Gynecology  Why:  Postpartum visit  Contact information:  [address] [id number] [phone]                    Zonia Cr MD  OBGYN PGY-1

## 2019-03-27 NOTE — PROGRESS NOTES
POSTPARTUM PROGRESS NOTE     Lore Garcia is a 33 y.o. female POD #2 status post   section at 37w2d in a pregnancy complicated by chronic HTN on norvasc, GDM, obesity, and history of myomyectomy. Patient is doing well this morning. She denies nausea, vomiting, fever or chills.  Patient reports moderate abdominal pain that is adequately relieved by oral pain medications. Lochia is mild to moderate  and stable. Patient is voiding without difficulty and ambulating with no difficulty. She has passed flatus, and has not had BM.  Patient does plan to breast feed. Contraception per Dr. Ruiz.    Objective:       Temp:  [97.7 °F (36.5 °C)-98.6 °F (37 °C)] 98.2 °F (36.8 °C)  Pulse:  [] 106  Resp:  [18] 18  SpO2:  [97 %-98 %] 97 %  BP: (119-143)/(66-83) 135/81    General:   alert, appears stated age and cooperative   Lungs:   normal respiratory effort   Heart:   regular rate and rhythm   Abdomen:  soft, non-tender; bowel sounds normal; no masses,  no organomegaly   Uterus:  firm located at the umblicus.    Incision: Clean, dry and intact   Extremities: no edema, redness or tenderness in the calves or thighs     Lab Review  No results found for this or any previous visit (from the past 4 hour(s)).    I/O  No intake or output data in the 24 hours ending 19 0611     Assessment:     Patient Active Problem List   Diagnosis    History of myomectomy -  at 37-39 weeks ( at 10 am)    Prior pregnancy with fetal demise @ 20 weeks    Chronic hypertension affecting pregnancy- Norvasc, ASA 81 mg after 1st trimester    Morbid obesity with BMI of 40.0-44.9, adult    Hypothyroidism    Insulin controlled gestational diabetes mellitus (GDM) in third trimester (NPH 33 Units am, 17 Units pm; Regular 17 Units three times per day; Metformin 1000 mg q day)    S/P  section    History of  delivery        Plan:   1. Postpartum care:  - Patient doing well. Continue routine management and  advances.  - Continue PO pain meds. Pain well controlled.  - Heme: H/h 12/37 > 11/34  - Encourage ambulation  - Contraception per Dr. Ruiz  - Lactation prn    2. Chronic HTN  - BP: (119-143)/(66-83) 135/81  - Asymptomatic  - Continue home norvasc    3. GDM  - Fasting blood glucose 106    4. Obesity  - BMI 43  - Encourage ambulation      Dispo: As patient meets milestones, will plan to discharge POD#3-4.    Zonia Cr MD  OBGYN PGY-1

## 2019-03-28 VITALS
RESPIRATION RATE: 18 BRPM | OXYGEN SATURATION: 98 % | HEART RATE: 113 BPM | TEMPERATURE: 98 F | BODY MASS INDEX: 43.65 KG/M2 | HEIGHT: 65 IN | SYSTOLIC BLOOD PRESSURE: 146 MMHG | WEIGHT: 262 LBS | DIASTOLIC BLOOD PRESSURE: 81 MMHG

## 2019-03-28 PROCEDURE — 25000003 PHARM REV CODE 250: Performed by: STUDENT IN AN ORGANIZED HEALTH CARE EDUCATION/TRAINING PROGRAM

## 2019-03-28 RX ADMIN — DOCUSATE SODIUM 200 MG: 100 CAPSULE, LIQUID FILLED ORAL at 08:03

## 2019-03-28 RX ADMIN — HYDROCODONE BITARTRATE AND ACETAMINOPHEN 1 TABLET: 10; 325 TABLET ORAL at 05:03

## 2019-03-28 RX ADMIN — AMLODIPINE BESYLATE 5 MG: 5 TABLET ORAL at 08:03

## 2019-03-28 NOTE — PROGRESS NOTES
POSTPARTUM PROGRESS NOTE     Lore Garcia is a 33 y.o. female POD #3 status post   section at 37w2d in a pregnancy complicated by chronic HTN on norvasc, GDM, obesity, and history of myomyectomy. Patient is doing well this morning. She denies nausea, vomiting, fever or chills.  Patient reports moderate abdominal pain that is adequately relieved by oral pain medications. Lochia is mild to moderate  and stable. Patient is voiding without difficulty and ambulating with no difficulty. She has passed flatus, and has not had BM.  Patient does plan to breast feed. Contraception per Dr. Ruiz.    Objective:       Temp:  [98.1 °F (36.7 °C)-98.6 °F (37 °C)] 98.6 °F (37 °C)  Pulse:  [107-115] 110  Resp:  [18] 18  SpO2:  [97 %-100 %] 100 %  BP: (111-141)/(60-87) 141/78    General:   alert, appears stated age and cooperative   Lungs:   normal respiratory effort   Heart:   regular rate and rhythm   Abdomen:  soft, non-tender; bowel sounds normal; no masses,  no organomegaly   Uterus:  firm located at the umblicus.    Incision: Clean, dry and intact   Extremities: no edema, redness or tenderness in the calves or thighs     Lab Review  No results found for this or any previous visit (from the past 4 hour(s)).    I/O  No intake or output data in the 24 hours ending 19 0626     Assessment:     Patient Active Problem List   Diagnosis    History of myomectomy -  at 37-39 weeks ( at 10 am)    Prior pregnancy with fetal demise @ 20 weeks    Chronic hypertension affecting pregnancy- Norvasc, ASA 81 mg after 1st trimester    Morbid obesity with BMI of 40.0-44.9, adult    Hypothyroidism    Insulin controlled gestational diabetes mellitus (GDM) in third trimester (NPH 33 Units am, 17 Units pm; Regular 17 Units three times per day; Metformin 1000 mg q day)    S/P  section    History of  delivery        Plan:   1. Postpartum care:  - Patient doing well. Continue routine management and  advances.  - Continue PO pain meds. Pain well controlled.  - Heme: H/h 12/37 > 11/34  - Encourage ambulation  - Contraception per Dr. Ruiz  - Lactation prn    2. Chronic HTN  - BP: (111-141)/(60-87) 141/78  - Asymptomatic  - Continue home norvasc    3. GDM  - Fasting blood glucose 106    4. Obesity  - BMI 43  - Encourage ambulation      Dispo: As patient meets milestones, will plan to discharge POD#3-4.    Zonia Cr MD  OBGYN PGY-1

## 2019-03-28 NOTE — PLAN OF CARE
Problem: Adult Inpatient Plan of Care  Goal: Plan of Care Review  Outcome: Outcome(s) achieved Date Met: 03/28/19  Pt ambulating, voiding, and passing flatus. Pt tolerating PO well and there is no SS of distress at this time. Pt's pain well controlled throughout shift by oral pain medication. Pt's bleeding has been light throughout shift and fundus is firm. Vss.   Pt. Discharged via wheelchair with baby in arms by escort. Pt. Verbalized understanding about making her 1 week appointment for blood pressure check and 6 week postpartum appointment.

## 2019-03-30 ENCOUNTER — TELEPHONE (OUTPATIENT)
Dept: LACTATION | Facility: CLINIC | Age: 34
End: 2019-03-30

## 2019-03-30 NOTE — TELEPHONE ENCOUNTER
Mother states she has started pumping and supplementing after feedings. Ped told mother she could try Gentlease formula. Baby is sleepy at the breast and mother worried baby is not getting enough. Will continue to nurse, pump and supplement till baby is closer to due date. Going to see the MD Monday.

## 2019-04-03 ENCOUNTER — TELEPHONE (OUTPATIENT)
Dept: OBSTETRICS AND GYNECOLOGY | Facility: OTHER | Age: 34
End: 2019-04-03

## 2019-04-03 NOTE — TELEPHONE ENCOUNTER
Pt doing well at home. She is still experiencing some dull intermittent pain at her spinal site. Incision is healing well. PP follow up appt. Is scheduled and baby doing great and gaining weight.

## 2019-04-10 ENCOUNTER — LAB VISIT (OUTPATIENT)
Dept: LAB | Facility: OTHER | Age: 34
End: 2019-04-10
Payer: COMMERCIAL

## 2019-04-10 ENCOUNTER — POSTPARTUM VISIT (OUTPATIENT)
Dept: OBSTETRICS AND GYNECOLOGY | Facility: CLINIC | Age: 34
End: 2019-04-10
Payer: COMMERCIAL

## 2019-04-10 ENCOUNTER — TELEPHONE (OUTPATIENT)
Dept: OBSTETRICS AND GYNECOLOGY | Facility: CLINIC | Age: 34
End: 2019-04-10

## 2019-04-10 VITALS
DIASTOLIC BLOOD PRESSURE: 78 MMHG | SYSTOLIC BLOOD PRESSURE: 136 MMHG | HEIGHT: 65 IN | WEIGHT: 246.69 LBS | BODY MASS INDEX: 41.1 KG/M2

## 2019-04-10 DIAGNOSIS — O24.414 INSULIN CONTROLLED GESTATIONAL DIABETES MELLITUS (GDM) DURING PREGNANCY, ANTEPARTUM: ICD-10-CM

## 2019-04-10 DIAGNOSIS — Z01.30 BP CHECK: Primary | ICD-10-CM

## 2019-04-10 LAB
ALBUMIN SERPL BCP-MCNC: 3.7 G/DL (ref 3.5–5.2)
ALP SERPL-CCNC: 73 U/L (ref 55–135)
ALT SERPL W/O P-5'-P-CCNC: 25 U/L (ref 10–44)
ANION GAP SERPL CALC-SCNC: 10 MMOL/L (ref 8–16)
AST SERPL-CCNC: 21 U/L (ref 10–40)
BILIRUB SERPL-MCNC: 0.4 MG/DL (ref 0.1–1)
BUN SERPL-MCNC: 10 MG/DL (ref 6–20)
CALCIUM SERPL-MCNC: 9.4 MG/DL (ref 8.7–10.5)
CHLORIDE SERPL-SCNC: 105 MMOL/L (ref 95–110)
CO2 SERPL-SCNC: 24 MMOL/L (ref 23–29)
CREAT SERPL-MCNC: 0.8 MG/DL (ref 0.5–1.4)
EST. GFR  (AFRICAN AMERICAN): >60 ML/MIN/1.73 M^2
EST. GFR  (NON AFRICAN AMERICAN): >60 ML/MIN/1.73 M^2
GLUCOSE SERPL-MCNC: 120 MG/DL (ref 70–110)
POTASSIUM SERPL-SCNC: 3.7 MMOL/L (ref 3.5–5.1)
PROT SERPL-MCNC: 7.6 G/DL (ref 6–8.4)
SODIUM SERPL-SCNC: 139 MMOL/L (ref 136–145)

## 2019-04-10 PROCEDURE — 0503F POSTPARTUM CARE VISIT: CPT | Mod: S$GLB,,, | Performed by: NURSE PRACTITIONER

## 2019-04-10 PROCEDURE — 82239 BILE ACIDS TOTAL: CPT

## 2019-04-10 PROCEDURE — 99999 PR PBB SHADOW E&M-EST. PATIENT-LVL III: ICD-10-PCS | Mod: PBBFAC,,, | Performed by: NURSE PRACTITIONER

## 2019-04-10 PROCEDURE — 80053 COMPREHEN METABOLIC PANEL: CPT

## 2019-04-10 PROCEDURE — 99999 PR PBB SHADOW E&M-EST. PATIENT-LVL III: CPT | Mod: PBBFAC,,, | Performed by: NURSE PRACTITIONER

## 2019-04-10 PROCEDURE — 36415 COLL VENOUS BLD VENIPUNCTURE: CPT

## 2019-04-10 PROCEDURE — 0503F PR POSTPARTUM CARE VISIT: ICD-10-PCS | Mod: S$GLB,,, | Performed by: NURSE PRACTITIONER

## 2019-04-10 NOTE — TELEPHONE ENCOUNTER
----- Message from Eden Bruce sent at 4/10/2019  3:34 PM CDT -----  Contact: Patient   Patient was seen in office and asked if return forms for her employer was received by office and if the forms will be sent within this week as requested by employer. The patient can be reached at [phone].

## 2019-04-10 NOTE — PROGRESS NOTES
Postpartum Visit  Lore Garcia is a 33 y.o. female  is here for a postpartum BP check. She is 2 weeks postpartum following a low cervical transverse  section, of a female infant. The delivery was at 37w2d. Doing well. Incision healing great, no pain. Breast feeding and supplementing with formula. Baby is gaining weight, currently at home with grandma. She feels good, pedal swelling has improved. Still taking Metformin, Norvasc, and her PNV. No vaginal bleeding. Started having some itching on her palms of hands and soles of feet only. No rash. Denies any new soaps or lotions. Originally thought it might be from washing her hands so frequently. Using benadryl cream prn.     Pregnancy was complicated by: obesity, hx of myomectomy, CHTN, hypothyroidism, prior c/s with fetal demise, obesity, and GDM.    OB History    Para Term  AB Living   2 1 1 0 1 1   SAB TAB Ectopic Multiple Live Births   1     0 1      # Outcome Date GA Lbr Fran/2nd Weight Sex Delivery Anes PTL Lv   2 Term 19 37w2d  3.402 kg (7 lb 8 oz) F CS-LTranv Spinal, EPI N LETICIA   1 SAB 17 20w0d  0.283 kg (10 oz) F Vag-Spont  N FD      Complications: Abruptio Placenta       Postpartum course has been uncomplicated.  Bleeding no bleeding. Bowel/ bladder function is normal.     Baby's course has been uncomplicated. Baby is feeding by both breast and bottle.    ROS:  GENERAL: No fever, chills, fatigability.  VULVAR: No pain, no lesions and no itching.  VAGINAL: No relaxation, no itching, no discharge, no abnormal bleeding and no lesions.  ABDOMEN: No abdominal pain. Denies nausea. Denies vomiting. No diarrhea. No constipation  BREAST: Denies pain. No lumps. No discharge.  URINARY: No incontinence, no nocturia, no frequency and no dysuria.  CARDIOVASCULAR: No chest pain. No shortness of breath. No leg cramps.  NEUROLOGICAL: No headaches. No vision changes.      General appearance - alert, well appearing, and in no distress,  oriented to person, place, and time, normal appearing weight and obese  Mental status - alert, oriented to person, place, and time, normal mood, behavior, speech, dress, motor activity, and thought processes  Skin - coloration normal for race, good turgor, warm to touch, no rashes  Abdomen - soft, nontender, nondistended, no masses or organomegaly  Pfannenstiel incision: Clean, dry, intact - healing well.  Pelvic - Deferred, talk only  Extremities - no edema, redness or tenderness in the calves or thighs      Lore was seen today for postpartum care.    Diagnoses and all orders for this visit:    BP check    Postpartum care and examination  -     Glucose Tolerance 2 Hour; Future  -     Bile acids, cholylglycine; Future  -     Comprehensive metabolic panel; Future    Insulin controlled gestational diabetes mellitus (GDM) during pregnancy, antepartum  -     Glucose Tolerance 2 Hour; Future      Counseling regarding resuming normal activities of exercise and work.  Postpartum precautions reviewed  Needs 2 hour GTT with PP visit  CMP/bile acids today; continue with Benadryl cream prn  Continue taking Norvasc, PNV, and metformin as directed    RTC in 4 weeks for PP visit

## 2019-04-11 ENCOUNTER — TELEPHONE (OUTPATIENT)
Dept: OBSTETRICS AND GYNECOLOGY | Facility: CLINIC | Age: 34
End: 2019-04-11

## 2019-04-11 NOTE — TELEPHONE ENCOUNTER
Attempted to return call to the patient. No answer, left voicemail message for the patient that her FMLA forms have been received and processing takes 7-10 business days.

## 2019-04-11 NOTE — TELEPHONE ENCOUNTER
----- Message from Sushma Remy MA sent at 4/10/2019  4:07 PM CDT -----  Contact: Patient       ----- Message -----  From: Eden Bruce  Sent: 4/10/2019   3:34 PM  To: Joseph KENYON Staff    Patient was seen in office and asked if return forms for her employer was received by office and if the forms will be sent within this week as requested by employer. The patient can be reached at [phone].

## 2019-04-12 ENCOUNTER — TELEPHONE (OUTPATIENT)
Dept: OBSTETRICS AND GYNECOLOGY | Facility: CLINIC | Age: 34
End: 2019-04-12

## 2019-04-12 LAB — BILE AC SERPL-SCNC: 4 MCMOL/L

## 2019-04-12 NOTE — TELEPHONE ENCOUNTER
Spoke with pt about CMP results. Bile acids still pending. D/w pt itching may just be from swelling. It is now intermittent. Pt agrees and has her PP visit in May.

## 2019-05-30 ENCOUNTER — LAB VISIT (OUTPATIENT)
Dept: LAB | Facility: OTHER | Age: 34
End: 2019-05-30
Payer: COMMERCIAL

## 2019-05-30 ENCOUNTER — POSTPARTUM VISIT (OUTPATIENT)
Dept: OBSTETRICS AND GYNECOLOGY | Facility: CLINIC | Age: 34
End: 2019-05-30
Attending: OBSTETRICS & GYNECOLOGY
Payer: COMMERCIAL

## 2019-05-30 VITALS
HEIGHT: 65 IN | DIASTOLIC BLOOD PRESSURE: 88 MMHG | BODY MASS INDEX: 42.9 KG/M2 | WEIGHT: 257.5 LBS | SYSTOLIC BLOOD PRESSURE: 140 MMHG

## 2019-05-30 DIAGNOSIS — O24.414 INSULIN CONTROLLED GESTATIONAL DIABETES MELLITUS (GDM) DURING PREGNANCY, ANTEPARTUM: ICD-10-CM

## 2019-05-30 DIAGNOSIS — Z86.32 HISTORY OF GESTATIONAL DIABETES: ICD-10-CM

## 2019-05-30 PROBLEM — E03.9 HYPOTHYROIDISM: Status: RESOLVED | Noted: 2018-09-06 | Resolved: 2019-05-30

## 2019-05-30 PROBLEM — Z98.891 S/P CESAREAN SECTION: Status: RESOLVED | Noted: 2019-03-25 | Resolved: 2019-05-30

## 2019-05-30 PROBLEM — O10.919 CHRONIC HYPERTENSION AFFECTING PREGNANCY: Status: RESOLVED | Noted: 2018-08-09 | Resolved: 2019-05-30

## 2019-05-30 PROBLEM — Z98.890 HISTORY OF MYOMECTOMY: Chronic | Status: RESOLVED | Noted: 2018-02-06 | Resolved: 2019-05-30

## 2019-05-30 PROBLEM — Z98.891 HISTORY OF CESAREAN DELIVERY: Status: RESOLVED | Noted: 2019-03-25 | Resolved: 2019-05-30

## 2019-05-30 PROBLEM — E66.01 MORBID OBESITY WITH BMI OF 40.0-44.9, ADULT: Status: RESOLVED | Noted: 2018-09-06 | Resolved: 2019-05-30

## 2019-05-30 PROBLEM — O09.299 PRIOR PREGNANCY WITH FETAL DEMISE: Status: RESOLVED | Noted: 2018-08-09 | Resolved: 2019-05-30

## 2019-05-30 LAB
GLUCOSE SERPL-MCNC: 166 MG/DL
GLUCOSE SERPL-MCNC: 172 MG/DL
GLUCOSE SERPL-MCNC: 69 MG/DL (ref 70–110)

## 2019-05-30 PROCEDURE — 99999 PR PBB SHADOW E&M-EST. PATIENT-LVL III: CPT | Mod: PBBFAC,,, | Performed by: OBSTETRICS & GYNECOLOGY

## 2019-05-30 PROCEDURE — 0503F POSTPARTUM CARE VISIT: CPT | Mod: S$GLB,,, | Performed by: OBSTETRICS & GYNECOLOGY

## 2019-05-30 PROCEDURE — 82951 GLUCOSE TOLERANCE TEST (GTT): CPT

## 2019-05-30 PROCEDURE — 36415 COLL VENOUS BLD VENIPUNCTURE: CPT

## 2019-05-30 PROCEDURE — 0503F PR POSTPARTUM CARE VISIT: ICD-10-PCS | Mod: S$GLB,,, | Performed by: OBSTETRICS & GYNECOLOGY

## 2019-05-30 PROCEDURE — 99999 PR PBB SHADOW E&M-EST. PATIENT-LVL III: ICD-10-PCS | Mod: PBBFAC,,, | Performed by: OBSTETRICS & GYNECOLOGY

## 2019-05-30 NOTE — PROGRESS NOTES
"CC: Post-partum follow-up    Lore Garcia is a 34 y.o. female  who presents for post-partum visit.  She is S/P a , due to history of myomectomy.  She and the baby are doing well.  No pain.  No fever.   No bowel / bladder complaints.    Delivery Date: 2019  Delivery MD: Dr. Elicia Ruiz  Gender: female  Birth Weight:3402 grams  Breast Feeding: YES  Depression: NO  Contraception: natural family planning (NFP)    Pregnancy was complicated by:  1. Gestational diabetes  2. Chronic hypertension  3. Morbid obesity  4. History of myomectomy  5. Hypothyroidism    BP (!) 140/88 (BP Location: Right arm, Patient Position: Sitting, BP Method: Large (Manual))   Ht 5' 5" (1.651 m)   Wt 116.8 kg (257 lb 8 oz)   LMP 2018   Breastfeeding? No   BMI 42.85 kg/m²     ROS:  GENERAL: No fever, chills, fatigability.  VULVAR: No pain, no lesions and no itching.  VAGINAL: No relaxation, no itching, no discharge, no abnormal bleeding and no lesions.  ABDOMEN: No abdominal pain. Denies nausea. Denies vomiting. No diarrhea. No constipation  BREAST: Denies pain. No lumps. No discharge.  URINARY: No incontinence, no nocturia, no frequency and no dysuria.  CARDIOVASCULAR: No chest pain. No shortness of breath. No leg cramps.  NEUROLOGICAL: No headaches. No vision changes.    PHYSICAL EXAM:  ABDOMEN:  Soft, non-tender, non-distended  VULVA:  Normal, no lesions  CERVIX:  Without lesions, polyps or tenderness.  UTERUS:  Normal size, shape, consistency, no mass or tenderness.  ADNEXA:  Normal in size without mass or tenderness    IMP:  Doing well S/P , due to history of myomectomy  Instructions / precautions reviewed  Contraceptive counseling      PLAN:  May resume normal activities.  Fasting and 75 gram glucose pending    Follow up in about 1 year (around 2020).        "

## 2019-06-03 ENCOUNTER — TELEPHONE (OUTPATIENT)
Dept: OBSTETRICS AND GYNECOLOGY | Facility: CLINIC | Age: 34
End: 2019-06-03

## 2019-06-03 NOTE — TELEPHONE ENCOUNTER
Contacted the patient to inform her that the disability department will need all of her FMLA forms in order to process her request. Patient will fax the remainder of her forms over to [phone]. Patient verbalized understanding.

## 2019-09-16 DIAGNOSIS — I10 CHRONIC HYPERTENSION: ICD-10-CM

## 2019-09-17 RX ORDER — AMLODIPINE BESYLATE 5 MG/1
TABLET ORAL
Qty: 90 TABLET | Refills: 3 | Status: SHIPPED | OUTPATIENT
Start: 2019-09-17

## 2021-11-15 ENCOUNTER — OFFICE VISIT (OUTPATIENT)
Dept: URGENT CARE | Facility: CLINIC | Age: 36
End: 2021-11-15
Payer: COMMERCIAL

## 2021-11-15 VITALS
TEMPERATURE: 99 F | OXYGEN SATURATION: 98 % | DIASTOLIC BLOOD PRESSURE: 84 MMHG | HEART RATE: 96 BPM | HEIGHT: 65 IN | WEIGHT: 280 LBS | RESPIRATION RATE: 16 BRPM | BODY MASS INDEX: 46.65 KG/M2 | SYSTOLIC BLOOD PRESSURE: 134 MMHG

## 2021-11-15 DIAGNOSIS — J32.4 PANSINUSITIS, UNSPECIFIED CHRONICITY: Primary | ICD-10-CM

## 2021-11-15 PROCEDURE — 99203 PR OFFICE/OUTPT VISIT, NEW, LEVL III, 30-44 MIN: ICD-10-PCS | Mod: S$GLB,,, | Performed by: NURSE PRACTITIONER

## 2021-11-15 PROCEDURE — 99203 OFFICE O/P NEW LOW 30 MIN: CPT | Mod: S$GLB,,, | Performed by: NURSE PRACTITIONER

## 2021-11-15 RX ORDER — PREDNISONE 20 MG/1
20 TABLET ORAL DAILY
Qty: 5 TABLET | Refills: 0 | Status: SHIPPED | OUTPATIENT
Start: 2021-11-15 | End: 2021-11-20

## 2021-11-15 RX ORDER — IPRATROPIUM BROMIDE 42 UG/1
2 SPRAY, METERED NASAL 3 TIMES DAILY
Qty: 15 ML | Refills: 0 | Status: SHIPPED | OUTPATIENT
Start: 2021-11-15 | End: 2021-11-19

## 2021-11-15 RX ORDER — AMOXICILLIN AND CLAVULANATE POTASSIUM 875; 125 MG/1; MG/1
1 TABLET, FILM COATED ORAL 2 TIMES DAILY
Qty: 20 TABLET | Refills: 0 | Status: SHIPPED | OUTPATIENT
Start: 2021-11-15 | End: 2021-11-25

## 2025-04-05 ENCOUNTER — OFFICE VISIT (OUTPATIENT)
Dept: URGENT CARE | Facility: CLINIC | Age: 40
End: 2025-04-05
Payer: COMMERCIAL

## 2025-04-05 VITALS
HEART RATE: 110 BPM | HEIGHT: 65 IN | BODY MASS INDEX: 40.26 KG/M2 | SYSTOLIC BLOOD PRESSURE: 150 MMHG | WEIGHT: 241.63 LBS | OXYGEN SATURATION: 99 % | RESPIRATION RATE: 18 BRPM | TEMPERATURE: 99 F | DIASTOLIC BLOOD PRESSURE: 82 MMHG

## 2025-04-05 DIAGNOSIS — R05.1 ACUTE COUGH: ICD-10-CM

## 2025-04-05 DIAGNOSIS — J06.9 UPPER RESPIRATORY TRACT INFECTION, UNSPECIFIED TYPE: Primary | ICD-10-CM

## 2025-04-05 LAB
CTP QC/QA: YES
CTP QC/QA: YES
POC MOLECULAR INFLUENZA A AGN: NEGATIVE
POC MOLECULAR INFLUENZA B AGN: NEGATIVE
SARS CORONAVIRUS 2 ANTIGEN: NEGATIVE

## 2025-04-05 PROCEDURE — 87811 SARS-COV-2 COVID19 W/OPTIC: CPT | Mod: QW,S$GLB,, | Performed by: PHYSICIAN ASSISTANT

## 2025-04-05 PROCEDURE — 87502 INFLUENZA DNA AMP PROBE: CPT | Mod: QW,S$GLB,, | Performed by: PHYSICIAN ASSISTANT

## 2025-04-05 PROCEDURE — 99203 OFFICE O/P NEW LOW 30 MIN: CPT | Mod: S$GLB,,, | Performed by: PHYSICIAN ASSISTANT

## 2025-04-05 RX ORDER — FLUTICASONE PROPIONATE 50 MCG
2 SPRAY, SUSPENSION (ML) NASAL 2 TIMES DAILY
Qty: 9.9 ML | Refills: 0 | Status: SHIPPED | OUTPATIENT
Start: 2025-04-05

## 2025-04-05 RX ORDER — PROMETHAZINE HYDROCHLORIDE AND DEXTROMETHORPHAN HYDROBROMIDE 6.25; 15 MG/5ML; MG/5ML
5 SYRUP ORAL EVERY 4 HOURS PRN
Qty: 120 ML | Refills: 0 | Status: SHIPPED | OUTPATIENT
Start: 2025-04-05 | End: 2025-04-15

## 2025-04-05 RX ORDER — CETIRIZINE HYDROCHLORIDE 10 MG/1
10 TABLET ORAL DAILY
Qty: 14 TABLET | Refills: 0 | Status: SHIPPED | OUTPATIENT
Start: 2025-04-05 | End: 2025-04-19

## 2025-04-05 RX ORDER — TIRZEPATIDE 5 MG/.5ML
INJECTION, SOLUTION SUBCUTANEOUS
COMMUNITY
Start: 2025-03-13

## 2025-04-05 NOTE — PROGRESS NOTES
"Subjective:      Patient ID: Lore Garcia is a 39 y.o. female.    Vitals:  height is 5' 5" (1.651 m) and weight is 109.6 kg (241 lb 10 oz). Her oral temperature is 98.5 °F (36.9 °C). Her blood pressure is 150/82 (abnormal) and her pulse is 110. Her respiration is 18 and oxygen saturation is 99%.     Chief Complaint: Sinus Problem    Patient symptoms started yesterday. She states sinus with a headache.    Sinus Problem  This is a new problem. The current episode started yesterday. The problem is unchanged. There has been no fever. Her pain is at a severity of 6/10. The pain is mild. Associated symptoms include congestion, headaches, sinus pressure, sneezing and a sore throat. Past treatments include oral decongestants. The treatment provided mild relief.       HENT:  Positive for congestion, sinus pressure and sore throat.    Allergic/Immunologic: Positive for sneezing.   Neurological:  Positive for headaches.      Objective:     Physical Exam   Constitutional: She is oriented to person, place, and time. She appears well-developed. She is cooperative.  Non-toxic appearance. She does not appear ill. No distress. obesity  HENT:   Head: Normocephalic and atraumatic.   Ears:   Right Ear: Hearing, tympanic membrane, external ear and ear canal normal.   Left Ear: Hearing, tympanic membrane, external ear and ear canal normal.   Nose: Congestion present. No mucosal edema, rhinorrhea or nasal deformity. No epistaxis. Right sinus exhibits no maxillary sinus tenderness and no frontal sinus tenderness. Left sinus exhibits no maxillary sinus tenderness and no frontal sinus tenderness.   Mouth/Throat: Uvula is midline, oropharynx is clear and moist and mucous membranes are normal. No trismus in the jaw. Normal dentition. No uvula swelling. No oropharyngeal exudate, posterior oropharyngeal edema or posterior oropharyngeal erythema.   Eyes: Conjunctivae and lids are normal. No scleral icterus.   Neck: Trachea normal and phonation " normal. Neck supple. No edema present. No erythema present. No neck rigidity present.   Cardiovascular: Normal rate, regular rhythm, normal heart sounds and normal pulses.   Pulmonary/Chest: Effort normal and breath sounds normal. No respiratory distress. She has no decreased breath sounds. She has no rhonchi.   Abdominal: Normal appearance.   Musculoskeletal: Normal range of motion.         General: No deformity. Normal range of motion.   Neurological: She is alert and oriented to person, place, and time. She exhibits normal muscle tone. Coordination normal.   Skin: Skin is warm, dry, intact, not diaphoretic and not pale.   Psychiatric: Her speech is normal and behavior is normal. Judgment and thought content normal.   Nursing note and vitals reviewed.      Assessment:     1. Upper respiratory tract infection, unspecified type    2. Acute cough        Plan:       Upper respiratory tract infection, unspecified type  -     cetirizine (ZYRTEC) 10 MG tablet; Take 1 tablet (10 mg total) by mouth once daily. for 14 days  Dispense: 14 tablet; Refill: 0  -     fluticasone propionate (FLONASE) 50 mcg/actuation nasal spray; 2 sprays (100 mcg total) by Each Nostril route 2 (two) times a day.  Dispense: 9.9 mL; Refill: 0  -     promethazine-dextromethorphan (PROMETHAZINE-DM) 6.25-15 mg/5 mL Syrp; Take 5 mLs by mouth every 4 (four) hours as needed (cough).  Dispense: 120 mL; Refill: 0    Acute cough  -     SARS Coronavirus 2 Antigen, POCT Manual Read  -     POCT Influenza A/B MOLECULAR      Results for orders placed or performed in visit on 04/05/25   POCT Influenza A/B MOLECULAR    Collection Time: 04/05/25  1:40 PM   Result Value Ref Range    POC Molecular Influenza A Ag Negative Negative    POC Molecular Influenza B Ag Negative Negative     Acceptable Yes    SARS Coronavirus 2 Antigen, POCT Manual Read    Collection Time: 04/05/25  1:44 PM   Result Value Ref Range    SARS Coronavirus 2 Antigen Negative  Negative, Presumptive Negative     Acceptable Yes

## (undated) DEVICE — SOL NS 1000CC

## (undated) DEVICE — ELECTRODE REM PLYHSV RETURN 9

## (undated) DEVICE — SUT MCRYL PLUS 4-0 PS2 27IN

## (undated) DEVICE — KIT PROCEDURE STER INLET CLOSU

## (undated) DEVICE — BEAMPATH ROBOTIC FIBER

## (undated) DEVICE — SET TRI-LUMEN FILTERED TUBE

## (undated) DEVICE — APPLICATOR LAPAROSCOPIC EXTEND

## (undated) DEVICE — NDL INSUF ULTRA VERESS 120MM

## (undated) DEVICE — CORD BIPOLAR 12 FOOT

## (undated) DEVICE — SEE MEDLINE ITEM 156923

## (undated) DEVICE — SUT V-LOC ABSRB WOUND CLSR

## (undated) DEVICE — Device

## (undated) DEVICE — SYR 30CC LUER LOCK

## (undated) DEVICE — INSERT CUSHIONPRONE VIEW LARGE

## (undated) DEVICE — SOL ELECTROLUBE ANTI-STIC

## (undated) DEVICE — POSITIONER HEAD ADULT

## (undated) DEVICE — PORT AIRSEAL 12/120MM LPI

## (undated) DEVICE — MANIPULATOR TIP RUMI ORANGE

## (undated) DEVICE — KIT WING PAD POSITIONING

## (undated) DEVICE — SOL STRL WATER INJ 1000ML BG

## (undated) DEVICE — SET DECANTER MEDICHOICE

## (undated) DEVICE — CLOSURE SKIN STERI STRIP 1/2X4

## (undated) DEVICE — GLOVE BIOGEL SKINSENSE PI 6.0

## (undated) DEVICE — SEAL CANNULA DA VINCI 12MM

## (undated) DEVICE — DEVICE ANC SW STAT FOLEY 6-24

## (undated) DEVICE — COVER TIP CURVED SCISSORS XI

## (undated) DEVICE — JELLY KY LUBRICATING 5G PACKET

## (undated) DEVICE — SUT V-LOC 180 ABD 2/0 GS-21

## (undated) DEVICE — HEMOSTAT VITAGEL RT 4.5

## (undated) DEVICE — DRESSING LEUKOPLAST FLEX 1X3IN

## (undated) DEVICE — ULTRASOUND RENTAL

## (undated) DEVICE — SUT 0 V-LOC GR GS-21 TAPER

## (undated) DEVICE — NDL SPINAL SPINOCAN 22GX3.5

## (undated) DEVICE — UNDERGLOVE BIOGEL PI SZ 6.5 LF